# Patient Record
Sex: MALE | Race: WHITE | Employment: OTHER
[De-identification: names, ages, dates, MRNs, and addresses within clinical notes are randomized per-mention and may not be internally consistent; named-entity substitution may affect disease eponyms.]

---

## 2018-01-29 ENCOUNTER — TELEPHONE (OUTPATIENT)
Dept: CASE MANAGEMENT | Age: 68
End: 2018-01-29

## 2018-01-29 NOTE — LETTER
Medical Imaging Services                        1/29/2018           [facility]  [address] [place] [address]       Dear Arron Lopez,    An appointment has been scheduled for you for a CT Lung Screening. Appointment Details:  February 06 2018  [facility] Radiology Department  Time: 7:30 AM   (Please arrive 30 minutes early for registration)      Please fill out the enclosed questionnaire, bring it with you the day of screening and give it to the technologist who completes your scan. Please make sure to answer every question and call us at [phone] with any questions you might have. If you are unable to keep the appointment please call the scheduling department at [phone] to cancel or reschedule your appointment. Sincerely,    Fermin    P:  [phone]  F:  [phone]            Exam Date: 2/6/18  Facility:  [facility]  Male    1950 78 y.o.                           [id number]  [address] [address]      Order Verified_____    BL: ____   AN:______       Sukumar Boyle MD          Smoking Hx per anthonyan order: Former or Current.   Pkyrs______/ Quit date:  ____    Shared Decision Making Visit _____     Smoking Cessation Discussion ______    Coverage:____________________________________    Radiologist:________________       Scanner:  ___________________________    CTDiVol:_________ DLP:  ___________  Slice:  ______________    Lung Rad-______________  Inc__________________________________________      HT_______  WT________   Race_____  Ethn____      Patient notification letter  ____________  Physician Notified__________________    Req Smoking Cessation_____      Packet mailed____________  Referral _________    Date entered into NRDR____________________      Follow up notes:    ___________________________________________________________________________ ___________________________________________________________________________________________________________________________________________________________________________________________________________________________________________________________________________________________________________________________________________________________________________________________________________________________________________________________________________________________________________________________________________________________________________

## 2018-02-07 ENCOUNTER — TELEPHONE (OUTPATIENT)
Dept: CASE MANAGEMENT | Age: 68
End: 2018-02-07

## 2018-02-08 ENCOUNTER — TELEPHONE (OUTPATIENT)
Dept: CASE MANAGEMENT | Age: 68
End: 2018-02-08

## 2018-02-08 NOTE — TELEPHONE ENCOUNTER
Patient requested information on smoking cessation. Smoking Cessation packet that contains \"Time to [address] [facility] brochure and  Santos Naznicole Quit Now? \"- [facility],  both mailed to patients home. Hazel Shultz was  encouraged to call and talk to one of our counselors or our Lung Screening patient navigator for more information/support.      JHONY Chavarria., R.T.(R)(T),   [facility]  Lung Nodule Navigator

## 2018-10-24 ENCOUNTER — HOSPITAL ENCOUNTER (OUTPATIENT)
Age: 68
Discharge: HOME | End: 2018-10-24
Payer: MEDICARE

## 2018-10-24 LAB — PROSTATE SPECIFIC ANTIGEN: 0.28 NG/ML (ref 0–4)

## 2018-10-24 PROCEDURE — G0103 PSA SCREENING: HCPCS

## 2018-10-24 PROCEDURE — 36415 COLL VENOUS BLD VENIPUNCTURE: CPT

## 2019-01-15 ENCOUNTER — TELEPHONE (OUTPATIENT)
Dept: CASE MANAGEMENT | Age: 69
End: 2019-01-15

## 2019-02-15 ENCOUNTER — TELEPHONE (OUTPATIENT)
Dept: CASE MANAGEMENT | Age: 69
End: 2019-02-15

## 2021-02-10 ENCOUNTER — HOSPITAL ENCOUNTER (OUTPATIENT)
Age: 71
Discharge: HOME | End: 2021-02-10
Payer: MEDICARE

## 2021-02-10 LAB
ALBUMIN SERPL-MCNC: 4.2 G/DL (ref 3.5–5.2)
ALP BLD-CCNC: 110 U/L (ref 40–129)
ALT SERPL-CCNC: 8 U/L (ref 0–40)
ANION GAP SERPL CALCULATED.3IONS-SCNC: 10 MMOL/L (ref 7–16)
AST SERPL-CCNC: 20 U/L (ref 0–39)
BASOPHILS ABSOLUTE: 0.05 E9/L (ref 0–0.2)
BASOPHILS RELATIVE PERCENT: 1 % (ref 0–2)
BILIRUB SERPL-MCNC: 0.3 MG/DL (ref 0–1.2)
BUN BLDV-MCNC: 11 MG/DL (ref 8–23)
CALCIUM SERPL-MCNC: 9.1 MG/DL (ref 8.6–10.2)
CHLORIDE BLD-SCNC: 99 MMOL/L (ref 98–107)
CHOLESTEROL, TOTAL: 198 MG/DL (ref 0–199)
CO2: 29 MMOL/L (ref 22–29)
CREAT SERPL-MCNC: 0.8 MG/DL (ref 0.7–1.2)
EOSINOPHILS ABSOLUTE: 0.14 E9/L (ref 0.05–0.5)
EOSINOPHILS RELATIVE PERCENT: 2.7 % (ref 0–6)
GFR AFRICAN AMERICAN: >60
GFR NON-AFRICAN AMERICAN: >60 ML/MIN/1.73
GLUCOSE BLD-MCNC: 90 MG/DL (ref 74–99)
HCT VFR BLD CALC: 45 % (ref 37–54)
HDLC SERPL-MCNC: 73 MG/DL
HEMOGLOBIN: 15 G/DL (ref 12.5–16.5)
IMMATURE GRANULOCYTES #: 0.02 E9/L
IMMATURE GRANULOCYTES %: 0.4 % (ref 0–5)
LDL CHOLESTEROL CALCULATED: 115 MG/DL (ref 0–99)
LYMPHOCYTES ABSOLUTE: 1.47 E9/L (ref 1.5–4)
LYMPHOCYTES RELATIVE PERCENT: 28.1 % (ref 20–42)
MCH RBC QN AUTO: 31.3 PG (ref 26–35)
MCHC RBC AUTO-ENTMCNC: 33.3 % (ref 32–34.5)
MCV RBC AUTO: 93.8 FL (ref 80–99.9)
MONOCYTES ABSOLUTE: 0.41 E9/L (ref 0.1–0.95)
MONOCYTES RELATIVE PERCENT: 7.8 % (ref 2–12)
NEUTROPHILS ABSOLUTE: 3.14 E9/L (ref 1.8–7.3)
NEUTROPHILS RELATIVE PERCENT: 60 % (ref 43–80)
PDW BLD-RTO: 12.8 FL (ref 11.5–15)
PLATELET # BLD: 243 E9/L (ref 130–450)
PMV BLD AUTO: 10.4 FL (ref 7–12)
POTASSIUM SERPL-SCNC: 4.7 MMOL/L (ref 3.5–5)
PROSTATE SPECIFIC ANTIGEN: 0.34 NG/ML (ref 0–4)
RBC # BLD: 4.8 E12/L (ref 3.8–5.8)
SODIUM BLD-SCNC: 138 MMOL/L (ref 132–146)
TOTAL PROTEIN: 6.9 G/DL (ref 6.4–8.3)
TRIGL SERPL-MCNC: 52 MG/DL (ref 0–149)
VLDLC SERPL CALC-MCNC: 10 MG/DL
WBC # BLD: 5.2 E9/L (ref 4.5–11.5)

## 2021-02-10 PROCEDURE — G0103 PSA SCREENING: HCPCS

## 2021-02-10 PROCEDURE — 85025 COMPLETE CBC W/AUTO DIFF WBC: CPT

## 2021-02-10 PROCEDURE — 80053 COMPREHEN METABOLIC PANEL: CPT

## 2021-02-10 PROCEDURE — 80186 ASSAY OF PHENYTOIN FREE: CPT

## 2021-02-10 PROCEDURE — 36415 COLL VENOUS BLD VENIPUNCTURE: CPT

## 2021-02-10 PROCEDURE — 80061 LIPID PANEL: CPT

## 2021-02-10 PROCEDURE — 80185 ASSAY OF PHENYTOIN TOTAL: CPT

## 2021-02-12 LAB
PHENYTOIN % FREE: 10.3 % (ref 8–14)
PHENYTOIN FREE: 1.4 UG/ML (ref 1–2.5)
PHENYTOIN LEVEL: 13.6 UG/ML (ref 10–20)

## 2022-01-06 ENCOUNTER — TELEPHONE (OUTPATIENT)
Dept: FAMILY MEDICINE CLINIC | Age: 72
End: 2022-01-06

## 2022-01-06 RX ORDER — BENZONATATE 100 MG/1
100 CAPSULE ORAL 3 TIMES DAILY PRN
Qty: 21 CAPSULE | Refills: 1 | Status: SHIPPED | OUTPATIENT
Start: 2022-01-06 | End: 2022-01-13

## 2022-01-06 RX ORDER — DOXYCYCLINE HYCLATE 100 MG
100 TABLET ORAL 2 TIMES DAILY
Qty: 14 TABLET | Refills: 1 | Status: SHIPPED | OUTPATIENT
Start: 2022-01-06 | End: 2022-01-13

## 2022-01-06 RX ORDER — BROMPHENIRAMINE MALEATE, PSEUDOEPHEDRINE HYDROCHLORIDE, AND DEXTROMETHORPHAN HYDROBROMIDE 2; 30; 10 MG/5ML; MG/5ML; MG/5ML
5 SYRUP ORAL 4 TIMES DAILY PRN
Qty: 120 ML | Refills: 1 | Status: SHIPPED | OUTPATIENT
Start: 2022-01-06

## 2022-01-06 RX ORDER — DEXAMETHASONE 6 MG/1
6 TABLET ORAL
Qty: 7 TABLET | Refills: 0 | Status: SHIPPED | OUTPATIENT
Start: 2022-01-06 | End: 2022-01-13

## 2022-07-11 ENCOUNTER — HOSPITAL ENCOUNTER (OUTPATIENT)
Age: 72
Discharge: HOME | End: 2022-07-11
Payer: MEDICARE

## 2022-07-11 LAB
ALBUMIN SERPL-MCNC: 4.2 G/DL (ref 3.5–5.2)
ALP BLD-CCNC: 105 U/L (ref 40–129)
ALT SERPL-CCNC: 9 U/L (ref 0–40)
ANION GAP SERPL CALCULATED.3IONS-SCNC: 10 MMOL/L (ref 7–16)
AST SERPL-CCNC: 23 U/L (ref 0–39)
BASOPHILS ABSOLUTE: 0.05 E9/L (ref 0–0.2)
BASOPHILS RELATIVE PERCENT: 1 % (ref 0–2)
BILIRUB SERPL-MCNC: 0.3 MG/DL (ref 0–1.2)
BUN BLDV-MCNC: 12 MG/DL (ref 6–23)
CALCIUM SERPL-MCNC: 9.2 MG/DL (ref 8.6–10.2)
CHLORIDE BLD-SCNC: 98 MMOL/L (ref 98–107)
CHOLESTEROL, TOTAL: 218 MG/DL (ref 0–199)
CO2: 26 MMOL/L (ref 22–29)
CREAT SERPL-MCNC: 0.8 MG/DL (ref 0.7–1.2)
EOSINOPHILS ABSOLUTE: 0.07 E9/L (ref 0.05–0.5)
EOSINOPHILS RELATIVE PERCENT: 1.4 % (ref 0–6)
GFR AFRICAN AMERICAN: >60
GFR NON-AFRICAN AMERICAN: >60 ML/MIN/1.73
GLUCOSE BLD-MCNC: 86 MG/DL (ref 74–99)
HCT VFR BLD CALC: 42.2 % (ref 37–54)
HDLC SERPL-MCNC: 77 MG/DL
HEMOGLOBIN: 14.5 G/DL (ref 12.5–16.5)
IMMATURE GRANULOCYTES #: 0.02 E9/L
IMMATURE GRANULOCYTES %: 0.4 % (ref 0–5)
LDL CHOLESTEROL CALCULATED: 130 MG/DL (ref 0–99)
LYMPHOCYTES ABSOLUTE: 1.36 E9/L (ref 1.5–4)
LYMPHOCYTES RELATIVE PERCENT: 27.4 % (ref 20–42)
MCH RBC QN AUTO: 30.9 PG (ref 26–35)
MCHC RBC AUTO-ENTMCNC: 34.4 % (ref 32–34.5)
MCV RBC AUTO: 90 FL (ref 80–99.9)
MONOCYTES ABSOLUTE: 0.35 E9/L (ref 0.1–0.95)
MONOCYTES RELATIVE PERCENT: 7.1 % (ref 2–12)
NEUTROPHILS ABSOLUTE: 3.11 E9/L (ref 1.8–7.3)
NEUTROPHILS RELATIVE PERCENT: 62.7 % (ref 43–80)
PDW BLD-RTO: 12.9 FL (ref 11.5–15)
PLATELET # BLD: 242 E9/L (ref 130–450)
PMV BLD AUTO: 10 FL (ref 7–12)
POTASSIUM SERPL-SCNC: 4.6 MMOL/L (ref 3.5–5)
PROSTATE SPECIFIC ANTIGEN: 0.28 NG/ML (ref 0–4)
RBC # BLD: 4.69 E12/L (ref 3.8–5.8)
SODIUM BLD-SCNC: 134 MMOL/L (ref 132–146)
TOTAL PROTEIN: 6.6 G/DL (ref 6.4–8.3)
TRIGL SERPL-MCNC: 56 MG/DL (ref 0–149)
VLDLC SERPL CALC-MCNC: 11 MG/DL
WBC # BLD: 5 E9/L (ref 4.5–11.5)

## 2022-07-11 PROCEDURE — 36415 COLL VENOUS BLD VENIPUNCTURE: CPT

## 2022-07-11 PROCEDURE — 80053 COMPREHEN METABOLIC PANEL: CPT

## 2022-07-11 PROCEDURE — 80185 ASSAY OF PHENYTOIN TOTAL: CPT

## 2022-07-11 PROCEDURE — G0103 PSA SCREENING: HCPCS

## 2022-07-11 PROCEDURE — 85025 COMPLETE CBC W/AUTO DIFF WBC: CPT

## 2022-07-11 PROCEDURE — 80061 LIPID PANEL: CPT

## 2022-07-11 PROCEDURE — 80186 ASSAY OF PHENYTOIN FREE: CPT

## 2022-07-15 LAB
PHENYTOIN % FREE: 7.9 % (ref 8–14)
PHENYTOIN FREE: 1.2 UG/ML (ref 1–2.5)
PHENYTOIN LEVEL: 15.2 UG/ML (ref 10–20)

## 2023-07-17 ENCOUNTER — HOSPITAL ENCOUNTER (OUTPATIENT)
Age: 73
Discharge: HOME | End: 2023-07-17
Payer: MEDICARE

## 2023-07-17 PROCEDURE — 84153 ASSAY OF PSA TOTAL: CPT

## 2023-07-17 PROCEDURE — 36415 COLL VENOUS BLD VENIPUNCTURE: CPT

## 2023-07-18 LAB — PSA SERPL-MCNC: 0.27 NG/ML (ref 0–4)

## 2024-09-10 ENCOUNTER — HOSPITAL ENCOUNTER (OUTPATIENT)
Age: 74
Discharge: HOME | End: 2024-09-10
Payer: MEDICARE

## 2024-09-10 LAB — PSA SERPL-MCNC: 0.28 NG/ML (ref 0–4)

## 2024-09-10 PROCEDURE — G0103 PSA SCREENING: HCPCS

## 2024-09-10 PROCEDURE — 36415 COLL VENOUS BLD VENIPUNCTURE: CPT

## 2025-04-09 ENCOUNTER — APPOINTMENT (OUTPATIENT)
Dept: ORTHOPEDIC SURGERY | Facility: CLINIC | Age: 75
End: 2025-04-09
Payer: MEDICARE

## 2025-04-09 DIAGNOSIS — M54.16 LUMBAR RADICULOPATHY: ICD-10-CM

## 2025-04-09 PROCEDURE — 99204 OFFICE O/P NEW MOD 45 MIN: CPT | Performed by: ORTHOPAEDIC SURGERY

## 2025-04-09 PROCEDURE — 1125F AMNT PAIN NOTED PAIN PRSNT: CPT | Performed by: ORTHOPAEDIC SURGERY

## 2025-04-09 PROCEDURE — 1159F MED LIST DOCD IN RCRD: CPT | Performed by: ORTHOPAEDIC SURGERY

## 2025-04-09 ASSESSMENT — PAIN SCALES - GENERAL: PAINLEVEL_OUTOF10: 4

## 2025-04-09 ASSESSMENT — PAIN - FUNCTIONAL ASSESSMENT: PAIN_FUNCTIONAL_ASSESSMENT: 0-10

## 2025-04-09 NOTE — PROGRESS NOTES
Ashu is a very pleasant 74-year-old retired man who is referred by his brother, Rasta, who is a patient of mine.    He has had longstanding lower extremity pain and numbness.  Prolonged standing and walking is very difficult.  Stopping and sitting relieves his symptoms to some degree.    He has been involved in the pain program at the [place] [facility].    He uses gabapentin.    He has had a successful total hip replacement    He helps his wife significantly who has significant medical issues.    Family, social, and medical histories are obtained and reviewed.    30-point, patient-recorded Review of Systems is personally obtained and reviewed. Inclusive is no history of weight loss, change in appetite, recent change in activity level, change in bowel or bladder habits, fevers, chills, malaise, or night pain.    On exam pleasant older man no acute distress.  Slow deliberate gait.  Kyphotic posture.  He can come up to neutral.    Painless motion both hips.    His strength is intact in the lower extremities with mild weakness in ankle dorsiflexion bilateral, 4/5.  No hyperreflexia.    Plain films of the lumbar spine show preserved lordosis.  Short pedicles consistent with congenital stenosis.  Advanced degenerative disc disease throughout the lumbar spine.    An MRI from 2021 shows severe multilevel stenosis L2-L5, due to degenerative changes in the setting of severe congenital stenosis.    Impression: He has disabling lumbar radiculopathy and neurogenic claudication.  The degree of stenosis that he has certainly would warrant consideration of surgery.    Likely, surgery would be a posterior decompression.  He would not need a spine fusion.  The goal of surgery would be to decrease his lower extremity symptoms.    The risks and benefits and expectations of surgery have been discussed.    He is going to consider things.  I have suggested that we update his lumbar MRI to get a better sense of the magnitude of this  issue and then we will further discuss treatment options.    ** Dictated with voice recognition software and not immediately reviewed for errors in grammar and/or spelling **

## 2025-05-12 ENCOUNTER — ANCILLARY PROCEDURE (OUTPATIENT)
Facility: HOSPITAL | Age: 75
End: 2025-05-12
Payer: MEDICARE

## 2025-05-12 DIAGNOSIS — M54.16 LUMBAR RADICULOPATHY: ICD-10-CM

## 2025-05-12 PROCEDURE — 72148 MRI LUMBAR SPINE W/O DYE: CPT

## 2025-05-12 PROCEDURE — 72148 MRI LUMBAR SPINE W/O DYE: CPT | Performed by: RADIOLOGY

## 2025-05-15 ENCOUNTER — DOCUMENTATION (OUTPATIENT)
Dept: ORTHOPEDICS | Facility: HOSPITAL | Age: 75
End: 2025-05-15
Payer: MEDICARE

## 2025-05-15 NOTE — PROGRESS NOTES
I spoke with Axel.  His MRI, as expected, shows quite severe lumbar stenosis.  He is quite symptomatic with neurogenic claudication and radiculopathy.    We discussed the treatment options ranging from further conservative measures of exercise and conditioning to surgery in the form of a lumbar decompression.  He would be a good candidate for surgery in the form of an L2-5 decompression.  He would not need a spine fusion.    He is going to consider things.    ** Dictated with voice recognition software and not immediately reviewed for errors in grammar and/or spelling **

## 2025-05-28 PROBLEM — M48.062 SPINAL STENOSIS, LUMBAR REGION WITH NEUROGENIC CLAUDICATION: Status: ACTIVE | Noted: 2025-05-28

## 2025-06-26 ENCOUNTER — APPOINTMENT (OUTPATIENT)
Dept: PREADMISSION TESTING | Facility: HOSPITAL | Age: 75
End: 2025-06-26
Payer: MEDICARE

## 2025-06-26 ENCOUNTER — TELEPHONE (OUTPATIENT)
Dept: INPATIENT UNIT | Facility: HOSPITAL | Age: 75
End: 2025-06-26
Payer: MEDICARE

## 2025-06-26 RX ORDER — NAPROXEN 250 MG/1
250 TABLET ORAL
COMMUNITY
End: 2025-06-26 | Stop reason: SDUPTHER

## 2025-06-26 RX ORDER — GABAPENTIN 600 MG/1
600 TABLET ORAL 3 TIMES DAILY
COMMUNITY

## 2025-06-26 RX ORDER — ASPIRIN 81 MG/1
81 TABLET ORAL DAILY
COMMUNITY

## 2025-06-26 RX ORDER — VIT C/E/ZN/COPPR/LUTEIN/ZEAXAN 250MG-90MG
1 CAPSULE ORAL DAILY
COMMUNITY
End: 2025-07-04 | Stop reason: HOSPADM

## 2025-06-26 RX ORDER — NAPROXEN SODIUM 220 MG
220 TABLET ORAL
COMMUNITY
End: 2025-07-04 | Stop reason: HOSPADM

## 2025-06-26 RX ORDER — METOPROLOL SUCCINATE 25 MG/1
25 TABLET, EXTENDED RELEASE ORAL
COMMUNITY
Start: 2025-05-01

## 2025-06-26 RX ORDER — VIT C/E/ZN/COPPR/LUTEIN/ZEAXAN 250MG-90MG
600 CAPSULE ORAL 2 TIMES DAILY
COMMUNITY
End: 2025-07-04 | Stop reason: HOSPADM

## 2025-06-26 RX ORDER — PHENYTOIN SODIUM 100 MG/1
1 CAPSULE, EXTENDED RELEASE ORAL 3 TIMES DAILY
COMMUNITY

## 2025-06-26 RX ORDER — BISMUTH SUBSALICYLATE 262 MG
1 TABLET,CHEWABLE ORAL DAILY
COMMUNITY
End: 2025-07-04 | Stop reason: HOSPADM

## 2025-06-26 RX ORDER — GLUCOSAMINE HCL 500 MG
100 TABLET ORAL DAILY
COMMUNITY
End: 2025-07-04 | Stop reason: HOSPADM

## 2025-06-26 NOTE — CPM/PAT NURSE NOTE
CPM/PAT Nurse Note      Name: Ashu Gomez (Raymond Housel)  /Age: 1950/75 y.o.       Medical History[1]    Surgical History[2]    Patient  reports that he is not currently sexually active and has had partner(s) who are female.    Family History[3]    Allergies[4]    Prior to Admission medications    Medication Sig Start Date End Date Taking? Authorizing Provider   aspirin 81 mg EC tablet Take 1 tablet (81 mg) by mouth once daily.    Historical Provider, MD   co-enzyme Q-10 30 mg capsule Take 100 mg by mouth once daily.    Historical Provider, MD   gabapentin (Neurontin) 300 mg capsule Take 2 capsules (600 mg) by mouth 3 times a day.    Historical Provider, MD   metoprolol succinate XL (Toprol-XL) 25 mg 24 hr tablet Take 1 tablet (25 mg) by mouth once daily. 25   Historical Provider, MD   multivitamin tablet Take 1 tablet by mouth once daily.    Historical Provider, MD   naproxen sodium (Aleve) 220 mg tablet Take 1 tablet (220 mg) by mouth 2 times daily (morning and late afternoon).    Historical Provider, MD   omega-3 fatty acids-fish oil (Fish OiL) 360-1,200 mg capsule Take 1 capsule (1,200 mg) by mouth once daily.    Historical Provider, MD   phenytoin ER (Dilantin) 100 mg capsule Take 1 capsule (100 mg) by mouth 3 times a day.    Historical Provider, MD   red yeast rice 600 mg capsule Take 600 mg by mouth 2 times a day.    Historical Provider, MD   naproxen (Naprosyn) 250 mg tablet Take 1 tablet (250 mg) by mouth 2 times daily (morning and late afternoon).  25  Historical Provider, MD VILLANUEVA ROS     DASI Risk Score    No data to display       Caprini DVT Assessment    No data to display       Modified Frailty Index    No data to display       [id number] Stroke Risk Points  Current as of just now        N/A 0 to 9 Points:      Last Change: N/A          The [id number]-BOXe risk score (Lip STEFANI, et al. 2009. © 2010 American College [employer]) quantifies the risk of stroke for a patient  with atrial fibrillation. For patients without atrial fibrillation or under the age of 18 this score appears as N/A. Higher score values generally indicate higher risk of stroke.        This score is not applicable to this patient. Components are not calculated.          Revised Cardiac Risk Index    No data to display       Apfel Simplified Score    No data to display       Risk Analysis Index Results This Encounter    No data found in the last 10 encounters.       Prodigy: High Risk  Total Score: 20              Prodigy Age Score      Prodigy Gender Score          ARISCAT Score for Postoperative Pulmonary Complications    No data to display       Ruiz Perioperative Risk for Myocardial Infarction or Cardiac Arrest (EARNESTINE)    No data to display         Nurse Plan of Action:     Med only RN screening call complete.  Reviewed allergies, medications and pharmacy.  Instructed patient to stop Aleve, vitamins and supplements now prior to surgery.             [1]   Past Medical History:  Diagnosis Date    Arthritis     Chronic pain disorder     Back    Colon polyp 25 yrs ago, get colonoscopies every 3 yrs    COVID-19 Jan. 2021    Twice even with current vaccines    Easy bruising ?    Fractures 40 yrs ago    Ankle    Hypertension 5 yrs ago    Irritable bowel syndrome Feb 2024    Joint pain     Back    Peripheral neuropathy     Ft and legs, 9 yrs ago    Seizure disorder (Multi) 25 yrs. Diagnosed epilepsy    Spinal stenosis    [2]   Past Surgical History:  Procedure Laterality Date    ANKLE FRACTURE SURGERY  1985?    Healed on its own    COLONOSCOPY  Apr 2024    Every 3 yrs    JOINT REPLACEMENT  May 2011    Left    OTHER SURGICAL HISTORY      Left total hip replacement  2011 and asteroid [id number]    TONSILLECTOMY  63 + yrs ago    UPPER GASTROINTESTINAL ENDOSCOPY  Apr. 2024   [3]   Family History  Problem Relation Name Age of Onset    No Known Problems Mother      Kidney disease Father      Heart disease Father      No  Known Problems Sister      Heart attack Brother     [4] No Known Allergies

## 2025-06-27 ENCOUNTER — PRE-ADMISSION TESTING (OUTPATIENT)
Dept: PREADMISSION TESTING | Facility: HOSPITAL | Age: 75
End: 2025-06-27
Payer: MEDICARE

## 2025-06-27 ENCOUNTER — LAB (OUTPATIENT)
Dept: LAB | Facility: HOSPITAL | Age: 75
End: 2025-06-27
Payer: MEDICARE

## 2025-06-27 VITALS
HEART RATE: 62 BPM | TEMPERATURE: 97.6 F | HEIGHT: 68 IN | OXYGEN SATURATION: 98 % | SYSTOLIC BLOOD PRESSURE: 157 MMHG | WEIGHT: 123.46 LBS | BODY MASS INDEX: 18.71 KG/M2 | RESPIRATION RATE: 16 BRPM | DIASTOLIC BLOOD PRESSURE: 88 MMHG

## 2025-06-27 DIAGNOSIS — R73.9 ELEVATED BLOOD SUGAR: ICD-10-CM

## 2025-06-27 DIAGNOSIS — R06.02 SHORTNESS OF BREATH: ICD-10-CM

## 2025-06-27 DIAGNOSIS — Z01.818 ENCOUNTER FOR OTHER PREPROCEDURAL EXAMINATION: Primary | ICD-10-CM

## 2025-06-27 DIAGNOSIS — R73.9 HYPERGLYCEMIA, UNSPECIFIED: ICD-10-CM

## 2025-06-27 DIAGNOSIS — Z01.818 PREOP TESTING: Primary | ICD-10-CM

## 2025-06-27 DIAGNOSIS — R06.02 SOB (SHORTNESS OF BREATH) ON EXERTION: ICD-10-CM

## 2025-06-27 DIAGNOSIS — R39.9 LOWER URINARY TRACT SYMPTOMS (LUTS): ICD-10-CM

## 2025-06-27 LAB
ABO GROUP (TYPE) IN BLOOD: NORMAL
ANION GAP SERPL CALC-SCNC: 10 MMOL/L (ref 10–20)
ANTIBODY SCREEN: NORMAL
APPEARANCE UR: CLEAR
APTT PPP: 30 SECONDS (ref 26–36)
BASOPHILS # BLD AUTO: 0.04 X10*3/UL (ref 0–0.1)
BASOPHILS NFR BLD AUTO: 0.7 %
BILIRUB UR STRIP.AUTO-MCNC: NEGATIVE MG/DL
BUN SERPL-MCNC: 13 MG/DL (ref 6–23)
CALCIUM SERPL-MCNC: 8.9 MG/DL (ref 8.6–10.3)
CHLORIDE SERPL-SCNC: 102 MMOL/L (ref 98–107)
CO2 SERPL-SCNC: 31 MMOL/L (ref 21–32)
COLOR UR: NORMAL
CREAT SERPL-MCNC: 0.77 MG/DL (ref 0.5–1.3)
EGFRCR SERPLBLD CKD-EPI 2021: >90 ML/MIN/1.73M*2
EOSINOPHIL # BLD AUTO: 0.05 X10*3/UL (ref 0–0.4)
EOSINOPHIL NFR BLD AUTO: 0.9 %
ERYTHROCYTE [DISTWIDTH] IN BLOOD BY AUTOMATED COUNT: 12.7 % (ref 11.5–14.5)
EST. AVERAGE GLUCOSE BLD GHB EST-MCNC: 97 MG/DL
GLUCOSE SERPL-MCNC: 70 MG/DL (ref 74–99)
GLUCOSE UR STRIP.AUTO-MCNC: NORMAL MG/DL
HBA1C MFR BLD: 5 % (ref ?–5.7)
HCT VFR BLD AUTO: 43 % (ref 41–52)
HGB BLD-MCNC: 14.5 G/DL (ref 13.5–17.5)
IMM GRANULOCYTES # BLD AUTO: 0.01 X10*3/UL (ref 0–0.5)
IMM GRANULOCYTES NFR BLD AUTO: 0.2 % (ref 0–0.9)
INR PPP: 1 (ref 0.9–1.1)
KETONES UR STRIP.AUTO-MCNC: NEGATIVE MG/DL
LEUKOCYTE ESTERASE UR QL STRIP.AUTO: NEGATIVE
LYMPHOCYTES # BLD AUTO: 1.25 X10*3/UL (ref 0.8–3)
LYMPHOCYTES NFR BLD AUTO: 22.1 %
MCH RBC QN AUTO: 31 PG (ref 26–34)
MCHC RBC AUTO-ENTMCNC: 33.7 G/DL (ref 32–36)
MCV RBC AUTO: 92 FL (ref 80–100)
MONOCYTES # BLD AUTO: 0.43 X10*3/UL (ref 0.05–0.8)
MONOCYTES NFR BLD AUTO: 7.6 %
NEUTROPHILS # BLD AUTO: 3.87 X10*3/UL (ref 1.6–5.5)
NEUTROPHILS NFR BLD AUTO: 68.5 %
NITRITE UR QL STRIP.AUTO: NEGATIVE
NRBC BLD-RTO: 0 /100 WBCS (ref 0–0)
PH UR STRIP.AUTO: 5 [PH]
PLATELET # BLD AUTO: 264 X10*3/UL (ref 150–450)
POTASSIUM SERPL-SCNC: 4.7 MMOL/L (ref 3.5–5.3)
PROT UR STRIP.AUTO-MCNC: NEGATIVE MG/DL
PROTHROMBIN TIME: 11.5 SECONDS (ref 9.8–12.4)
RBC # BLD AUTO: 4.67 X10*6/UL (ref 4.5–5.9)
RBC # UR STRIP.AUTO: NEGATIVE MG/DL
RH FACTOR (ANTIGEN D): NORMAL
SODIUM SERPL-SCNC: 138 MMOL/L (ref 136–145)
SP GR UR STRIP.AUTO: 1.01
UROBILINOGEN UR STRIP.AUTO-MCNC: NORMAL MG/DL
WBC # BLD AUTO: 5.7 X10*3/UL (ref 4.4–11.3)

## 2025-06-27 PROCEDURE — 86901 BLOOD TYPING SEROLOGIC RH(D): CPT

## 2025-06-27 PROCEDURE — 83036 HEMOGLOBIN GLYCOSYLATED A1C: CPT

## 2025-06-27 PROCEDURE — 85730 THROMBOPLASTIN TIME PARTIAL: CPT

## 2025-06-27 PROCEDURE — 93010 ELECTROCARDIOGRAM REPORT: CPT | Performed by: INTERNAL MEDICINE

## 2025-06-27 PROCEDURE — 99204 OFFICE O/P NEW MOD 45 MIN: CPT | Performed by: PHYSICIAN ASSISTANT

## 2025-06-27 PROCEDURE — 86850 RBC ANTIBODY SCREEN: CPT

## 2025-06-27 PROCEDURE — 85025 COMPLETE CBC W/AUTO DIFF WBC: CPT

## 2025-06-27 PROCEDURE — 81003 URINALYSIS AUTO W/O SCOPE: CPT | Performed by: PHYSICIAN ASSISTANT

## 2025-06-27 PROCEDURE — 86900 BLOOD TYPING SEROLOGIC ABO: CPT

## 2025-06-27 PROCEDURE — 85610 PROTHROMBIN TIME: CPT

## 2025-06-27 PROCEDURE — 87081 CULTURE SCREEN ONLY: CPT | Mod: AHULAB | Performed by: PHYSICIAN ASSISTANT

## 2025-06-27 PROCEDURE — 80048 BASIC METABOLIC PNL TOTAL CA: CPT

## 2025-06-27 PROCEDURE — 36415 COLL VENOUS BLD VENIPUNCTURE: CPT

## 2025-06-27 RX ORDER — CHLORHEXIDINE GLUCONATE ORAL RINSE 1.2 MG/ML
SOLUTION DENTAL
Qty: 475 ML | Refills: 0 | Status: SHIPPED | OUTPATIENT
Start: 2025-06-27 | End: 2025-07-04 | Stop reason: HOSPADM

## 2025-06-27 ASSESSMENT — ENCOUNTER SYMPTOMS
BLOOD IN STOOL: 0
RHINORRHEA: 0
SINUS CONGESTION: 0
DIARRHEA: 1
SHORTNESS OF BREATH: 0
NUMBNESS: 0
FEVER: 0
EYE PAIN: 0
UNEXPECTED WEIGHT CHANGE: 0
CONFUSION: 0
WOUND: 0
NAUSEA: 0
MYALGIAS: 0
ABDOMINAL PAIN: 0
NECK PAIN: 0
DYSPNEA AT REST: 0
SKIN CHANGES: 0
ABDOMINAL DISTENTION: 0
WHEEZING: 0
DYSURIA: 0
CHILLS: 0
COUGH: 0
DIFFICULTY URINATING: 0
DYSPNEA WITH EXERTION: 0
VOMITING: 0
VISUAL CHANGE: 0
LIGHT-HEADEDNESS: 0
BRUISES/BLEEDS EASILY: 1
EXCESSIVE BLEEDING: 0
HEMOPTYSIS: 0
NECK STIFFNESS: 0
DOUBLE VISION: 0
WEAKNESS: 0
ARTHRALGIAS: 1
PALPITATIONS: 0
CONSTIPATION: 0
TROUBLE SWALLOWING: 0
EYE DISCHARGE: 0
LIMITED RANGE OF MOTION: 0

## 2025-06-27 NOTE — CPM/PAT H&P
[facility]/PAT Evaluation       Name: Ashu Gomez (Ashu Gomez)  /Age: 1950/75 y.o.       Date of Consult: 25    Referring Provider: Dr. Moody    Surgery, Date, and Length:     L2-L5 Lumbar Spine Decompression   25, 135MIN    Ashu Gomez is a 75 y.o. year-old male who presents to the [facility] for perioperative risk assessment prior to surgery.    Patient presents with a primary diagnosis of lumbar spinal stenosis.     He has had longstanding lower extremity pain and numbness, since 2016.  Prolonged standing and walking is very difficult.  Stopping and sitting relieves his symptoms to some degree.      He has been involved in the pain program at the [place] [facility].  He uses gabapentin and naproxen, which provide some pain relief.  He has attempted LESI and RFA without much relief.      This note was created in part upon personal review of patient's medical records.      Patient is scheduled to have L2-L5 Lumbar Spine Decompression    Pt does admit to h/o alcohol abuse and tendencies of abuse. His family is concerned about the use of narcotics for his pain control. I assured pt and family that narcotics will likely only be utilized for the acute post operative pain and will be weaned down / off as soon as possible.     Pt denies any past history of anesthetic complications such as PONV, awareness, prolonged sedation, dental damage, aspiration, cardiac arrest, difficult intubation, difficult I.V. access or unexpected hospital admissions.  NO malignant hyperthermia and or pseudocholinesterase deficiency.  No history of blood transfusions     The patient is not a Scientology and will accept blood and blood products if medically indicated.   Type and screen sent.     Past Medical History:   Diagnosis Date    Arthritis     Chronic pain disorder     Back    Colon polyp 25 yrs ago, get colonoscopies every 3 yrs    COVID-.     Twice even with current vaccines    Easy bruising ?     Fractures 40 yrs ago    Ankle    Hypertension 5 yrs ago    Irritable bowel syndrome 2024    Joint pain     Back    Peripheral neuropathy     Ft and legs, 9 yrs ago    Seizure disorder (Multi) 25 yrs. Diagnosed epilepsy    Spinal stenosis        Past Surgical History:   Procedure Laterality Date    ANKLE FRACTURE SURGERY  ?    Healed on its own    COLONOSCOPY  2024    Every 3 yrs    JOINT REPLACEMENT  May 2011    Left    OTHER SURGICAL HISTORY      Left total hip replacement   and asteroid [id number]    TONSILLECTOMY  63 + yrs ago    UPPER GASTROINTESTINAL ENDOSCOPY  2024       Patient  reports that he is not currently sexually active and has had partner(s) who are female.    Family History   Problem Relation Name Age of Onset    No Known Problems Mother      Kidney disease Father      Heart disease Father      No Known Problems Sister      Heart attack Brother       Social History     Socioeconomic History    Marital status:      Spouse name: Not on file    Number of children: Not on file    Years of education: Not on file    Highest education level: Not on file   Occupational History    Not on file   Tobacco Use    Smoking status: Former     Current packs/day: 0.00     Average packs/day: 1 pack/day for 30.0 years (30.0 ttl pk-yrs)     Types: Cigarettes     Start date:      Quit date:      Years since quittin.4    Smokeless tobacco: Current     Types: Snuff    Tobacco comments:     10 oral pouches of snuff per day   Vaping Use    Vaping status: Never Used   Substance and Sexual Activity    Alcohol use: Not Currently     Comment: H/O alcoholism - last drank ; withdrawal at time caused seizures    Drug use: Never    Sexual activity: Not Currently     Partners: Female   Other Topics Concern    Not on file   Social History Narrative    Not on file     Social Drivers of Health     Financial Resource Strain: Not on file   Food Insecurity: Not on file   Transportation Needs: Not  on file   Physical Activity: Not on file   Stress: Not on file   Social Connections: Not on file   Intimate Partner Violence: Not on file   Housing Stability: Not on file        No Known Allergies    Current Outpatient Medications   Medication Instructions    aspirin 81 mg, Daily    chlorhexidine (Peridex) 0.12 % solution Swish for 30 seconds and spit 15mL of solution the night before and morning of surgery    coenzyme Q10 100 mg, Daily    gabapentin (Neurontin) 300 mg capsule 2 capsules, 3 times daily    metoprolol succinate XL (TOPROL-XL) 25 mg, Daily RT    multivitamin tablet 1 tablet, Daily    naproxen sodium (ALEVE) 220 mg, 2 times daily (morning and late afternoon)    omega-3 fatty acids-fish oil (Fish OiL) 360-1,200 mg capsule 1 capsule, Daily    phenytoin ER (Dilantin) 100 mg capsule 1 capsule, 3 times daily    red yeast rice 600 mg, 2 times daily           PAT ROS:   Constitutional:    no fever   no chills   no unexpected weight change  Neuro/Psych:    no numbness   no weakness   no light-headedness   no confusion  Eyes:    no discharge   no pain   no vision loss   no diplopia   no visual disturbance  Ears:    no ear pain   no hearing loss   no tinnitus  Nose:    no nasal discharge   no sinus congestion   no epistaxis  Mouth:    no dental issues   no mouth pain   no oral bleeding   no mouth lesions  Throat:    no throat pain   no dysphagia  Neck:    no neck pain   no neck stiffness  Cardio:    Functional 4 Mets. Patient denies SOB walking up 2 flights of stairs   Non-stop throughout the day; yard work - mowing / weed whacking / mulching  Cooking, cleaning and grocery shopping ; cares for ill wife   no chest pain   no palpitations   no peripheral edema   no dyspnea   no XIAO  Respiratory:    no cough   no wheezing   no hemoptysis   no shortness of breath  Endocrine:    no cold intolerance   no heat intolerance  GI:    no abdominal distention   no abdominal pain   no constipation   diarrhea   no nausea   no  vomiting   no blood in stool  :    no difficulty urinating   no dysuria   no oliguria  Musculoskeletal:    arthralgias (LBP, BLE)   no myalgias   no decreased ROM  Hematologic:    bruises/bleeds easily (ASA 81mg)   no excessive bleeding   no history of blood transfusion   no blood clots  Skin:   no skin changes   no sores/wound   no rash      Physical Exam  Constitutional:       General: He is not in acute distress.     Appearance: Normal appearance. He is not ill-appearing, toxic-appearing or diaphoretic.   HENT:      Head: Normocephalic and atraumatic.      Nose: Nose normal. No congestion or rhinorrhea.      Mouth/Throat:      Mouth: Mucous membranes are moist.      Pharynx: No posterior oropharyngeal erythema.   Eyes:      Extraocular Movements: Extraocular movements intact.      Conjunctiva/sclera: Conjunctivae normal.   Cardiovascular:      Rate and Rhythm: Normal rate and regular rhythm.      Pulses: Normal pulses.      Heart sounds: Normal heart sounds. No murmur heard.     No friction rub. No gallop.   Pulmonary:      Effort: Pulmonary effort is normal. No respiratory distress.      Breath sounds: Normal breath sounds. No stridor. No wheezing, rhonchi or rales.   Abdominal:      General: Bowel sounds are normal. There is no distension.      Palpations: Abdomen is soft. There is no mass.      Tenderness: There is no abdominal tenderness. There is no guarding or rebound.      Hernia: No hernia is present.   Musculoskeletal:         General: Tenderness (lumbar spine ; pain and decreased ROM with extension of lumbar spine) present. No swelling, deformity or signs of injury. Normal range of motion.      Cervical back: Normal range of motion and neck supple. No rigidity or tenderness.   Skin:     General: Skin is warm and dry.      Coloration: Skin is not jaundiced or pale.      Findings: No bruising, erythema, lesion or rash.   Neurological:      General: No focal deficit present.      Mental Status: He is  "alert and oriented to person, place, and time.      Cranial Nerves: No cranial nerve deficit.      Sensory: No sensory deficit.      Motor: No weakness.      Coordination: Coordination normal.   Psychiatric:         Mood and Affect: Mood normal.         Behavior: Behavior normal.          PAT AIRWAY:   Airway:     Mallampati::  II    Neck ROM::  Full   Few missing teeth; no loose teeth      Visit Vitals  /88   Pulse 62   Temp 36.4 °C (97.6 °F)   Resp 16   Ht 1.72 m (5' 7.72\")   Wt 56 kg (123 lb 7.3 oz)   SpO2 98%   BMI 18.93 kg/m²   Smoking Status Former   BSA 1.64 m²         EKG 6/27/25  Sinus bradycardia   Otherwise normal EKG  Vent rate = 56 bpm      Assessment and Plan:     75 y.o.  male  scheduled for L2-L5 Lumbar Spine Decompression on 7/1/25 with Dr. Moody for  lumbar spinal stenosis.   P[facility] to [facility] for perioperative risk stratification and optimization      Cardiovascular:  Patient has no active cardiac symptoms.   Patient denies any chest pain, tightness, heaviness, pressure, radiating pain, palpitations, irregular heartbeats, lightheadedness, cough, congestion, shortness of breath, XIAO, PND, near syncope, weight loss or gain.    METS: 4+  RCRI: 0 points, 3.9%  risk for postoperative MACE     HTN - cont metoprolol on dos   Encouraged lifestyle modifications, low-sodium diet, and increase activity as tolerated.  Monitor BP and follow up with managing physician for readings sustaining >140/90.       Borderline HLD - hold red yeast rice 7 days prior to surgery    Pulmonary:  No pulmonary diagnosis, however patient is at increased risk of perioperative complications secondary to  age > 60, major surgery, duration of surgery > 2 hours  Stop Bang score is 3 placing patient at intermediate risk for LA  ARISCAT: <26 points, 1.6% risk of in-hospital postoperative pulmonary complication  PRODIGY: High risk for opioid induced respiratory depression    **Pt provided with deep breathing exercises, " incentive spirometer and instructions for use during PAT visit today**      Neuro:  No neurologic diagnosis, however, the patient is at increased risk for perioperative delirium secondary to  age, polypharmacy, type and duration of surgery, Patient instructed on and provided cognitive exercises    Seizures - cont phenytoin as prescribed; last seizure 2005 (at time of alcohol withdrawal)    Hematology:  Patient instructed to ambulate as soon as possible postoperatively to decrease thromboembolic risk.   Initiate mechanical DVT prophylaxis as soon as possible and initiate chemical prophylaxis when deemed safe from a bleeding standpoint post surgery.     LABS: CBC, BMP, Coag, T&S, MRSA, A1c, UA flex and EKG ordered    Followup: LABS, MRSA and A1c pending    Caprini: 5    Risk assessment complete.  Patient is scheduled for a intermediate surgical risk procedure.        Preoperative medication instructions were provided and reviewed with the patient.  Any additional testing or evaluation was explained to the patient.  Nothing by mouth instructions were discussed and patient's questions were answered prior to conclusion to this encounter.  Patient verbalized understanding of preoperative instructions given in preadmission testing; discharge instructions available in EMR.    This note was dictated by a speech recognition.  Minor errors may have been detected in a speech recognition.        mechanical DVT prophylaxis as soon as possible and initiate chemical prophylaxis when deemed safe from a bleeding standpoint post surgery.     LABS: CBC, BMP, Coag, T&S, MRSA, A1c, UA flex and EKG ordered    Followup: LABS, MRSA and A1c pending    Addendum 6/30/25:  Lab results reviewed and unremarkable     Caprini: 5    Risk assessment complete.  Patient is scheduled for a intermediate surgical risk procedure.        Preoperative medication instructions were provided and reviewed with the patient.  Any additional testing or evaluation was explained to the patient.  Nothing by mouth instructions were discussed and patient's questions were answered prior to conclusion to this encounter.  Patient verbalized understanding of preoperative instructions given in preadmission testing; discharge instructions available in EMR.    This note was dictated by a speech recognition.  Minor errors may have been detected in a speech recognition.

## 2025-06-27 NOTE — CPM/PAT NURSE NOTE
CPM/PAT Nurse Note      Name: Ashu Gomez)  /Age: 1950/75 y.o.       Medical History[1]    Surgical History[2]    Patient  reports that he is not currently sexually active and has had partner(s) who are female.    Family History[3]    Allergies[4]    Prior to Admission medications    Medication Sig Start Date End Date Taking? Authorizing Provider   aspirin 81 mg EC tablet Take 1 tablet (81 mg) by mouth once daily.   Yes Historical Provider, MD   chlorhexidine (Peridex) 0.12 % solution Swish for 30 seconds and spit 15mL of solution the night before and morning of surgery 25   Valentine Fowler PA-C   co-enzyme Q-10 30 mg capsule Take 100 mg by mouth once daily.   Yes Historical Provider, MD   gabapentin (Neurontin) 300 mg capsule Take 2 capsules (600 mg) by mouth 3 times a day.   Yes Historical Provider, MD   metoprolol succinate XL (Toprol-XL) 25 mg 24 hr tablet Take 1 tablet (25 mg) by mouth once daily. 25  Yes Historical Provider, MD   multivitamin tablet Take 1 tablet by mouth once daily.   Yes Historical Provider, MD   naproxen sodium (Aleve) 220 mg tablet Take 1 tablet (220 mg) by mouth 2 times daily (morning and late afternoon).   Yes Historical Provider, MD   omega-3 fatty acids-fish oil (Fish OiL) 360-1,200 mg capsule Take 1 capsule (1,200 mg) by mouth once daily.   Yes Historical Provider, MD   phenytoin ER (Dilantin) 100 mg capsule Take 1 capsule (100 mg) by mouth 3 times a day.   Yes Historical Provider, MD   red yeast rice 600 mg capsule Take 600 mg by mouth 2 times a day.   Yes Historical Provider, MD   naproxen (Naprosyn) 250 mg tablet Take 1 tablet (250 mg) by mouth 2 times daily (morning and late afternoon).  25  Historical Provider, MD VILLANUEVA ROS     DASI Risk Score    No data to display       Caprini DVT Assessment    No data to display       Modified Frailty Index    No data to display       [id number] Stroke Risk Points  Current as of just now         N/A 0 to 9 Points:      Last Change: N/A          The [id number] risk score (Lip STEFANI, et al. 2009. © 2010 [employer]) quantifies the risk of stroke for a patient with atrial fibrillation. For patients without atrial fibrillation or under the age of 18 this score appears as N/A. Higher score values generally indicate higher risk of stroke.        This score is not applicable to this patient. Components are not calculated.          Revised Cardiac Risk Index    No data to display       Apfel Simplified Score    No data to display       Risk Analysis Index Results This Encounter    No data found in the last 10 encounters.       Prodigy: High Risk  Total Score: 20              Prodigy Age Score      Prodigy Gender Score          ARISCAT Score for Postoperative Pulmonary Complications      Flowsheet Row Pre-Admission Testing from 6/27/2025 in [facility] [place] with Valentine Fowler PA-C   Age Calculated Score 3 filed at 06/27/2025 1258   Preoperative SpO2 0 filed at 06/27/2025 1258   Respiratory infection in the last month Either upper or lower (i.e., URI, bronchitis, pneumonia), with fever and antibiotic treatment 0 filed at 06/27/2025 1258   Preoperative anemia (Hgb less than 10 g/dl) 0 filed at 06/27/2025 1258   Surgical incision  0 filed at 06/27/2025 1258   Duration of surgery  16 filed at 06/27/2025 1258   Emergency Procedure  0 filed at 06/27/2025 1258   ARISCAT Total Score  19 filed at 06/27/2025 1258          Sara Perioperative Risk for Myocardial Infarction or Cardiac Arrest (EARNESTINE)    No data to display         Nurse Plan of Action:       After Visit Summary (AVS) reviewed and patient verbalized good understanding of medications and NPO instructions.  Pre-op infection prevention measures:  CHG showers and mouthwash reviewed, understanding voiced.  CHG soap given and patient verbalized need to pick CHG mouthwash at their preferred local pharmacy.            [1]   Past Medical  History:  Diagnosis Date    Arthritis     Chronic pain disorder     Back    Colon polyp 25 yrs ago, get colonoscopies every 3 yrs    COVID-19 Jan. 2021    Twice even with current vaccines    Easy bruising ?    Fractures 40 yrs ago    Ankle    Hypertension 5 yrs ago    Irritable bowel syndrome Feb 2024    Joint pain     Back    Peripheral neuropathy     Ft and legs, 9 yrs ago    Seizure disorder (Multi) 25 yrs. Diagnosed epilepsy    Spinal stenosis    [2]   Past Surgical History:  Procedure Laterality Date    ANKLE FRACTURE SURGERY  1985?    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 2024   [3]   Family History  Problem Relation Name Age of Onset    No Known Problems Mother      Kidney disease Father      Heart disease Father      No Known Problems Sister      Heart attack Brother     [4] No Known Allergies

## 2025-06-27 NOTE — PREPROCEDURE INSTRUCTIONS
Medication List            Accurate as of June 27, 2025 12:59 PM. Always use your most recent med list.                Aleve 220 mg tablet  Generic drug: naproxen sodium  Additional Medication Adjustments for Surgery: Take last dose 7 days before surgery  Notes to patient: Stop now     aspirin 81 mg EC tablet  Medication Adjustments for Surgery: Take on the morning of surgery     chlorhexidine 0.12 % solution  Commonly known as: Peridex  Swish for 30 seconds and spit 15mL of solution the night before and morning of surgery  Medication Adjustments for Surgery: Take/Use as prescribed     coenzyme Q10 100 mg tablet  Additional Medication Adjustments for Surgery: Take last dose 7 days before surgery  Notes to patient: Stop now     Fish OiL 360-1,200 mg capsule  Generic drug: omega-3 fatty acids-fish oil  Additional Medication Adjustments for Surgery: Take last dose 7 days before surgery  Notes to patient: Stop now     gabapentin 300 mg capsule  Commonly known as: Neurontin  Medication Adjustments for Surgery: Take/Use as prescribed     metoprolol succinate XL 25 mg 24 hr tablet  Commonly known as: Toprol-XL  Medication Adjustments for Surgery: Take on the morning of surgery     multivitamin tablet  Additional Medication Adjustments for Surgery: Take last dose 7 days before surgery  Notes to patient: Stop now     phenytoin  mg capsule  Commonly known as: Dilantin  Medication Adjustments for Surgery: Take on the morning of surgery     red yeast rice 600 mg capsule  Additional Medication Adjustments for Surgery: Take last dose 7 days before surgery  Notes to patient: Stop now                          **Concerning above medication instructions, if medication is normally taken at night, continue normal schedule.**  **DO NOT TAKE NIGHT PRIOR AND MORNING OF SURGERY**    CONTACT SURGEON'S OFFICE IF YOU DEVELOP:  * Fever = 100.4 F   * New respiratory symptoms (e.g. cough, shortness of breath, respiratory distress, sore  throat)  * Recent loss of taste or smell  *Flu like symptoms such as headache, fatigue or gastrointestinal symptoms  * You develop any open sores, shingles, burning or painful urination   AND/OR:  * You no longer wish to have the surgery.  * Any other personal circumstances change that may lead to the need to cancel or defer this surgery.  *You were admitted to any hospital within one week of your planned procedure.    SMOKING:  *Quitting smoking can make a huge difference to your health and recovery from surgery.    *If you need help with quitting, call 0-167-QUIT-NOW.    THE DAY OF SURGERY:  *Do not eat any food after midnight the night before surgery.   *You must drink 13.5 ounces of clear liquids (i.e. water, black coffee (no milk or cream), tea, apple juice or electrolyte drinks (gatorade)) 2 hours before your arrival time.  *You may chew gum until 2 hours before your surgery    SURGICAL TIME  *You will be contacted between 2 p.m. and 6 p.m. the business day before your surgery with your arrival time.  *If you haven't received a call by 6pm, call [phone].  *Scheduled surgery times may change and you will be notified if this occurs-check your personal voicemail for any updates.    ON THE MORNING OF SURGERY:  *Wear comfortable, loose fitting clothing.   *Do not use moisturizers, creams, lotions or perfume.  *All jewelry and valuables should be left at home.  *Prosthetic devices such as contact lenses, hearing aids, dentures, eyelash extensions, hairpins and body piercing must be removed before surgery.    BRING WITH YOU:  *Photo ID and insurance card  *Current list of medicines and allergies  *Pacemaker/Defibrillator/Heart stent cards  *CPAP machine and mask  *Slings/splints/crutches  *Copy of your complete Advanced Directive/DHPOA-if applicable  *Neurostimulator implant remote    PARKING AND ARRIVAL:  *Check in at the Main Entrance desk and let them know you are here for surgery.  *You will be directed to  the 2nd floor surgical waiting area.    AFTER OUTPATIENT SURGERY:  *A responsible adult MUST accompany you at the time of discharge and stay with you for 24 hours after your surgery.  *You may NOT drive yourself home after surgery.  *You may use a taxi or ride sharing service ([employer], Uber) to return home ONLY if you are accompanied by a friend or family member.  *Instructions for resuming your medications will be provided by your surgeon.       Home Preoperative Antibacterial Shower     What is a home preoperative antibacterial shower?  This shower is a way of cleaning the skin with a germ killing soap before surgery.  The soap contains chlorhexidine, commonly known as CHG.  CHG is a soap for your skin with germ killing ability.  Let your doctor know if you are allergic to chlorhexidine.    Why do I need to take a preoperative antibacterial shower?  Skin is not sterile.  It is best to try to make your skin as free of germs as possible before surgery.  Proper cleansing with a germ killing soap before surgery can lower the number of germs on your skin.  This helps to reduce the risk of infection at the surgical site.  Following the instructions listed below will help you prepare your skin for surgery.      How do I use the CHG skin cleanser?  Steps:  Begin using your CHG soap five days before your scheduled surgery on ________________________.    Days 1-4 Shower before bed:  Wash your face and genitals with your normal soap and rinse.           2.    Apply the CHG soap to a clean wet washcloth.  Turn the water off or move away                From the water spray to avoid premature rinsing of the CHG soap as you are applying.     3.   Lather your entire body from the neck down.  Do not use on your face or genitals.  4. Pay special attention to the area(s) where your incision(s) will be located unless they are on your face.  Avoid scrubbing your skin too hard.  The important point is to have the CHG soap sit on your skin  for 3 minutes.    When the 3 minutes are up, turn on the water and rinse the CHG soap off your body completely.   Pat yourself dry with a clean, freshly-laundered towel.  Dress in clean, freshly laundered night clothes.    Be sure to change bed sheets and blankets at least on the first night of CHG body wash use. May change linens every night of the above protocol for maximum benefit.   Day 5:  Last shower is the morning of surgery: Follow above Instructions.    NOTE:        *Keep CHG soap out of eyes and ear canals   *DO NOT wash with regular soap on your body after you have used the CHG soap solution  *DO NOT apply powders, lotions, or perfume.  *Deodorant may be used days 1-4, BUT NOT the day of surgery    Who should I contact if I have any questions regarding the use of CHG soap?  Call the [facility], Preadmission Testing at [phone] if you have any questions.              Patient Information: Pre-Operative Infection Prevention Measures     Why did I have my nose, under my arms and groin swabbed?  The purpose of the swab is to identify Staphylococcus aureus inside your nose or on your skin.  The swab was sent to the laboratory for culture.  A positive swab/culture for Staphylococcus aureus is called colonization or carriage.      What is Staphylococcus aureus?  Staphylococcus aureus, also known as “staph”, is a germ found on the skin or in the nose of healthy people.  Sometimes Staphylococcus aureus can get into the body and cause an infection.  This can be minor (such as pimples, boils or other skin problems).  It might also be serious (such as blood infection, pneumonia or a surgical site infection).    What is Staphylococcus aureus colonization or carriage?  Colonization or carriage means that a person has the germ but is not sick from it.  These bacteria can be spread on the hands or when breathing or sneezing.    How is Staphylococcus aureus spread?  It is most often  spread by close contact with a person or item that carries it.    What happens if my culture is positive for Staphylococcus aureus?  Your doctor/medical team will use this information to guide any antibiotic treatment which may be necessary.  Regardless of the culture results, we will clean the inside of your nose with a betadine swab just before you have your surgery.      Will I get an infection if I have Staphylococcus aureus in my nose or on my skin?  Anyone can get an infection with Staphylococcus aureus.  However, the best way to reduce your risk of infection is to follow the instructions provided to you for the use of your CHG soap and dental rinse.        Who should I contact if I have any questions?  Call the [facility], Preadmission Testing at [phone] if you have any questions.          Patient Information: Oral/Dental Rinse  **This is a prescription; pick it up at your preferred local pharmacy **  What is oral/dental rinse?   It is a mouthwash. It is a way of cleaning the mouth with a germ killing solution before your surgery.  The solution contains chlorhexidine, commonly known as CHG.   It is used inside the mouth to kill a bacteria known as Staphylococcus aureus.  Let your doctor know if you are allergic to Chlorhexidine.    Why do I need to use CHG oral/dental rinse?  The CHG oral/dental rinse helps to kill a bacteria in your mouth known a Staphylococcus aureus.     This reduces the risk of infection at the surgical site.      Using your CHG oral/dental rinse  STEPS:  Use your CHG oral/dental rinse after you brush your teeth the night before (at bedtime) and the morning of your surgery.  Follow all directions on your prescription label.    Use the cap on the container to measure 15ml (fill cap to fill line)  Swish (gargle if you can) the mouthwash in your mouth for at least 30 seconds, (do not to swallow) spit out  After you use your CHG rinse, do not rinse your  mouth with water, drink or eat.  Please refer to prescription label for the appropriate time to resume oral intake  Dental rinse comes in one size bottle: 473ml ~16oz.  You will have leftover    rinse, discard after this use.    What side effects might I have using the CHG oral/dental rinse?  CHG rinse will stick to plaque on the teeth.  Brush and floss just before use.  Teeth brushing will help avoid staining of plaque during use.    Who should I contact if I have questions about the CHG oral/dental rinse?  Please call [facility], Preadmission Testing at [phone] if you have any questions          Incentive Spirometer   You were provided with an incentive spirometer in [facility], please follow the below instructions.   You were not provided an incentive spirometer in [facility], please disregard the incentive spirometer instructions  What is an incentive spirometer?  An incentive spirometer is a device used before and after surgery to “exercise” your lungs.  It helps you to take deeper breaths to expand your lungs.  Below is an example of a basic incentive spirometer.  The device you receive may differ slightly but they all function the same.    Why do I need to use an incentive spirometer?  Using your incentive spirometer prepares your lungs for surgery and helps prevent lung problems after surgery.  How do I use my incentive spirometer?  When you're using your incentive spirometer, make sure to breathe through your mouth. If you breathe through your nose, the incentive spirometer won't work properly. You can hold your nose if you have trouble.  If you feel dizzy at any time, stop and rest. Try again at a later time.  Follow the steps below:  Set up your incentive spirometer, expand the flexible tubing and connect to the outlet.  Sit upright in a chair or bed. Hold the incentive spirometer at eye level.   Put the mouthpiece in your mouth and close your lips tightly around it. Slowly  breathe out (exhale) completely.  Breathe in (inhale) slowly through your mouth as deeply as you can. As you take a breath, you will see the piston rise inside the large column. While the piston rises, the indicator should move upwards. It should stay in between the 2 arrows (see Figure).  Try to get the piston as high as you can, while keeping the indicator between the arrows.   If the indicator doesn't stay between the arrows, you're breathing either too fast or too slow.  When you get it as high as you can, hold your breath for 10 seconds, or as long as possible. While you're holding your breath, the piston will slowly fall to the base of the spirometer.  Once the piston reaches the bottom of the spirometer, breathe out slowly through your mouth. Rest for a few seconds.  Repeat 10 times. Try to get the piston to the same level with each breath.  Repeat every hour while awake  You can carefully clean the outside of the mouthpiece with an alcohol wipe or soap and water.        Preoperative Deep Breathing Exercises  Why it is important to do deep breathing exercises before my surgery?  Deep breathing exercises strengthen your breathing muscles.  This helps you to recover after your surgery and decreases the chance of breathing complications.  How are the deep breathing exercises done?  Sit straight with your back supported.  Breathe in deeply and slowly through your nose. Your lower rib cage should expand and your abdomen may move forward.  Hold that breath for 3 to 5 seconds.  Breathe out through pursed lips, slowly and completely.  Rest and repeat 10 times every hour while awake.  Rest longer if you become dizzy or lightheaded.        Preoperative Brain Exercises    What are brain exercises?  A brain exercise is any activity that engages your thinking (cognitive) skills.    What types of activities are considered brain exercises?  Jigsaw puzzles, crossword puzzles, word jumble, memory games, word search, and many  more.  Many can be found free online or on your phone via a mobile kieran.    Why should I do brain exercises before my surgery?  More recent research has shown brain exercise before surgery can lower the risk of postoperative delirium (confusion) which can be especially important for older adults.  Patients who did brain exercises for 5 to 10 hours (total) for the 7-10 days before surgery, cut their risk of postoperative delirium in half up to 1 week after surgery.

## 2025-06-29 LAB — STAPHYLOCOCCUS SPEC CULT: NORMAL

## 2025-06-30 ENCOUNTER — HOSPITAL ENCOUNTER (OUTPATIENT)
Dept: CARDIOLOGY | Facility: HOSPITAL | Age: 75
Discharge: HOME | End: 2025-06-30
Payer: MEDICARE

## 2025-06-30 PROBLEM — M48.061 DEGENERATIVE LUMBAR SPINAL STENOSIS: Status: ACTIVE | Noted: 2025-06-30

## 2025-06-30 LAB
ATRIAL RATE: 56 BPM
P AXIS: 88 DEGREES
P OFFSET: 212 MS
P ONSET: 142 MS
PR INTERVAL: 158 MS
Q ONSET: 221 MS
QRS COUNT: 9 BEATS
QRS DURATION: 84 MS
QT INTERVAL: 428 MS
QTC CALCULATION(BAZETT): 413 MS
QTC FREDERICIA: 418 MS
R AXIS: 79 DEGREES
T AXIS: 76 DEGREES
T OFFSET: 435 MS
VENTRICULAR RATE: 56 BPM

## 2025-06-30 PROCEDURE — 93005 ELECTROCARDIOGRAM TRACING: CPT

## 2025-07-01 ENCOUNTER — HOSPITAL ENCOUNTER (INPATIENT)
Facility: HOSPITAL | Age: 75
LOS: 3 days | Discharge: HOME | End: 2025-07-04
Attending: ORTHOPAEDIC SURGERY | Admitting: ORTHOPAEDIC SURGERY
Payer: MEDICARE

## 2025-07-01 ENCOUNTER — ANESTHESIA EVENT (OUTPATIENT)
Dept: OPERATING ROOM | Facility: HOSPITAL | Age: 75
End: 2025-07-01
Payer: MEDICARE

## 2025-07-01 ENCOUNTER — ANESTHESIA (OUTPATIENT)
Dept: OPERATING ROOM | Facility: HOSPITAL | Age: 75
End: 2025-07-01
Payer: MEDICARE

## 2025-07-01 ENCOUNTER — APPOINTMENT (OUTPATIENT)
Dept: RADIOLOGY | Facility: HOSPITAL | Age: 75
End: 2025-07-01
Payer: MEDICARE

## 2025-07-01 DIAGNOSIS — M48.062 SPINAL STENOSIS, LUMBAR REGION WITH NEUROGENIC CLAUDICATION: ICD-10-CM

## 2025-07-01 DIAGNOSIS — M48.061 DEGENERATIVE LUMBAR SPINAL STENOSIS: Primary | ICD-10-CM

## 2025-07-01 LAB
ABO GROUP (TYPE) IN BLOOD: NORMAL
ANTIBODY SCREEN: NORMAL
RH FACTOR (ANTIGEN D): NORMAL

## 2025-07-01 PROCEDURE — 2500000001 HC RX 250 WO HCPCS SELF ADMINISTERED DRUGS (ALT 637 FOR MEDICARE OP): Performed by: ANESTHESIOLOGY

## 2025-07-01 PROCEDURE — 01NB0ZZ RELEASE LUMBAR NERVE, OPEN APPROACH: ICD-10-PCS | Performed by: ORTHOPAEDIC SURGERY

## 2025-07-01 PROCEDURE — 2500000004 HC RX 250 GENERAL PHARMACY W/ HCPCS (ALT 636 FOR OP/ED): Performed by: ORTHOPAEDIC SURGERY

## 2025-07-01 PROCEDURE — 72020 X-RAY EXAM OF SPINE 1 VIEW: CPT

## 2025-07-01 PROCEDURE — 2500000004 HC RX 250 GENERAL PHARMACY W/ HCPCS (ALT 636 FOR OP/ED)

## 2025-07-01 PROCEDURE — 1100000001 HC PRIVATE ROOM DAILY

## 2025-07-01 PROCEDURE — 3600000009 HC OR TIME - EACH INCREMENTAL 1 MINUTE - PROCEDURE LEVEL FOUR: Performed by: ORTHOPAEDIC SURGERY

## 2025-07-01 PROCEDURE — 99100 ANES PT EXTEME AGE<1 YR&>70: CPT | Performed by: ANESTHESIOLOGY

## 2025-07-01 PROCEDURE — 97161 PT EVAL LOW COMPLEX 20 MIN: CPT | Mod: GP

## 2025-07-01 PROCEDURE — 7100000002 HC RECOVERY ROOM TIME - EACH INCREMENTAL 1 MINUTE: Performed by: ORTHOPAEDIC SURGERY

## 2025-07-01 PROCEDURE — 2500000005 HC RX 250 GENERAL PHARMACY W/O HCPCS

## 2025-07-01 PROCEDURE — 36415 COLL VENOUS BLD VENIPUNCTURE: CPT | Performed by: ORTHOPAEDIC SURGERY

## 2025-07-01 PROCEDURE — 63047 LAM FACETEC & FORAMOT LUMBAR: CPT | Performed by: ORTHOPAEDIC SURGERY

## 2025-07-01 PROCEDURE — 2500000004 HC RX 250 GENERAL PHARMACY W/ HCPCS (ALT 636 FOR OP/ED): Mod: JZ | Performed by: ANESTHESIOLOGY

## 2025-07-01 PROCEDURE — 86850 RBC ANTIBODY SCREEN: CPT | Performed by: ORTHOPAEDIC SURGERY

## 2025-07-01 PROCEDURE — A63047 PR LAMINEC/FACETECT/FORAMIN,LUMBAR 1 SEG: Performed by: ANESTHESIOLOGY

## 2025-07-01 PROCEDURE — 3700000001 HC GENERAL ANESTHESIA TIME - INITIAL BASE CHARGE: Performed by: ORTHOPAEDIC SURGERY

## 2025-07-01 PROCEDURE — 2500000001 HC RX 250 WO HCPCS SELF ADMINISTERED DRUGS (ALT 637 FOR MEDICARE OP): Performed by: ORTHOPAEDIC SURGERY

## 2025-07-01 PROCEDURE — 97116 GAIT TRAINING THERAPY: CPT | Mod: GP

## 2025-07-01 PROCEDURE — A63047 PR LAMINEC/FACETECT/FORAMIN,LUMBAR 1 SEG: Performed by: NURSE ANESTHETIST, CERTIFIED REGISTERED

## 2025-07-01 PROCEDURE — 86901 BLOOD TYPING SEROLOGIC RH(D): CPT | Performed by: ORTHOPAEDIC SURGERY

## 2025-07-01 PROCEDURE — 7100000024 HC EXTENDED STAY RECOVERY PER MINUTE- PACU: Performed by: ORTHOPAEDIC SURGERY

## 2025-07-01 PROCEDURE — 2500000005 HC RX 250 GENERAL PHARMACY W/O HCPCS: Performed by: ORTHOPAEDIC SURGERY

## 2025-07-01 PROCEDURE — 97110 THERAPEUTIC EXERCISES: CPT | Mod: GP

## 2025-07-01 PROCEDURE — 9420000001 HC RT PATIENT EDUCATION 5 MIN

## 2025-07-01 PROCEDURE — 7100000001 HC RECOVERY ROOM TIME - INITIAL BASE CHARGE: Performed by: ORTHOPAEDIC SURGERY

## 2025-07-01 PROCEDURE — 3700000002 HC GENERAL ANESTHESIA TIME - EACH INCREMENTAL 1 MINUTE: Performed by: ORTHOPAEDIC SURGERY

## 2025-07-01 PROCEDURE — 63048 LAM FACETEC &FORAMOT EA ADDL: CPT | Performed by: ORTHOPAEDIC SURGERY

## 2025-07-01 PROCEDURE — 0QB00ZZ EXCISION OF LUMBAR VERTEBRA, OPEN APPROACH: ICD-10-PCS | Performed by: ORTHOPAEDIC SURGERY

## 2025-07-01 PROCEDURE — 2720000007 HC OR 272 NO HCPCS: Performed by: ORTHOPAEDIC SURGERY

## 2025-07-01 PROCEDURE — 2500000005 HC RX 250 GENERAL PHARMACY W/O HCPCS: Performed by: ANESTHESIOLOGY

## 2025-07-01 PROCEDURE — 3600000004 HC OR TIME - INITIAL BASE CHARGE - PROCEDURE LEVEL FOUR: Performed by: ORTHOPAEDIC SURGERY

## 2025-07-01 RX ORDER — LIDOCAINE HYDROCHLORIDE 10 MG/ML
0.1 INJECTION, SOLUTION EPIDURAL; INFILTRATION; INTRACAUDAL; PERINEURAL ONCE
Status: DISCONTINUED | OUTPATIENT
Start: 2025-07-01 | End: 2025-07-01 | Stop reason: HOSPADM

## 2025-07-01 RX ORDER — BACITRACIN 500 [USP'U]/G
OINTMENT TOPICAL AS NEEDED
Status: DISCONTINUED | OUTPATIENT
Start: 2025-07-01 | End: 2025-07-01 | Stop reason: HOSPADM

## 2025-07-01 RX ORDER — DEXMEDETOMIDINE HYDROCHLORIDE 4 UG/ML
INJECTION, SOLUTION INTRAVENOUS CONTINUOUS PRN
Status: DISCONTINUED | OUTPATIENT
Start: 2025-07-01 | End: 2025-07-01

## 2025-07-01 RX ORDER — DIPHENHYDRAMINE HYDROCHLORIDE 50 MG/ML
12.5 INJECTION, SOLUTION INTRAMUSCULAR; INTRAVENOUS EVERY 6 HOURS PRN
Status: DISCONTINUED | OUTPATIENT
Start: 2025-07-01 | End: 2025-07-04 | Stop reason: HOSPADM

## 2025-07-01 RX ORDER — PROCHLORPERAZINE 25 MG/1
25 SUPPOSITORY RECTAL EVERY 12 HOURS PRN
Status: DISCONTINUED | OUTPATIENT
Start: 2025-07-01 | End: 2025-07-01

## 2025-07-01 RX ORDER — NALOXONE HYDROCHLORIDE 0.4 MG/ML
0.2 INJECTION, SOLUTION INTRAMUSCULAR; INTRAVENOUS; SUBCUTANEOUS EVERY 5 MIN PRN
Status: DISCONTINUED | OUTPATIENT
Start: 2025-07-01 | End: 2025-07-04 | Stop reason: HOSPADM

## 2025-07-01 RX ORDER — PANTOPRAZOLE SODIUM 40 MG/1
40 TABLET, DELAYED RELEASE ORAL
Status: DISCONTINUED | OUTPATIENT
Start: 2025-07-02 | End: 2025-07-04 | Stop reason: HOSPADM

## 2025-07-01 RX ORDER — HYDROMORPHONE HYDROCHLORIDE 0.2 MG/ML
0.2 INJECTION INTRAMUSCULAR; INTRAVENOUS; SUBCUTANEOUS EVERY 4 HOURS PRN
Status: DISCONTINUED | OUTPATIENT
Start: 2025-07-01 | End: 2025-07-04 | Stop reason: HOSPADM

## 2025-07-01 RX ORDER — PROPOFOL 10 MG/ML
INJECTION, EMULSION INTRAVENOUS AS NEEDED
Status: DISCONTINUED | OUTPATIENT
Start: 2025-07-01 | End: 2025-07-01

## 2025-07-01 RX ORDER — PROCHLORPERAZINE MALEATE 10 MG
10 TABLET ORAL EVERY 6 HOURS PRN
Status: DISCONTINUED | OUTPATIENT
Start: 2025-07-01 | End: 2025-07-04 | Stop reason: HOSPADM

## 2025-07-01 RX ORDER — OXYCODONE HYDROCHLORIDE 5 MG/1
2.5 TABLET ORAL EVERY 4 HOURS PRN
Status: DISCONTINUED | OUTPATIENT
Start: 2025-07-01 | End: 2025-07-04 | Stop reason: HOSPADM

## 2025-07-01 RX ORDER — ALFUZOSIN HYDROCHLORIDE 10 MG/1
10 TABLET, EXTENDED RELEASE ORAL DAILY
COMMUNITY

## 2025-07-01 RX ORDER — POLYETHYLENE GLYCOL 3350 17 G/17G
17 POWDER, FOR SOLUTION ORAL DAILY
Status: DISCONTINUED | OUTPATIENT
Start: 2025-07-01 | End: 2025-07-04 | Stop reason: HOSPADM

## 2025-07-01 RX ORDER — ROCURONIUM BROMIDE 10 MG/ML
INJECTION, SOLUTION INTRAVENOUS AS NEEDED
Status: DISCONTINUED | OUTPATIENT
Start: 2025-07-01 | End: 2025-07-01

## 2025-07-01 RX ORDER — OXYCODONE HYDROCHLORIDE 5 MG/1
10 TABLET ORAL EVERY 4 HOURS PRN
Status: DISCONTINUED | OUTPATIENT
Start: 2025-07-01 | End: 2025-07-04 | Stop reason: HOSPADM

## 2025-07-01 RX ORDER — FENTANYL CITRATE 50 UG/ML
INJECTION, SOLUTION INTRAMUSCULAR; INTRAVENOUS AS NEEDED
Status: DISCONTINUED | OUTPATIENT
Start: 2025-07-01 | End: 2025-07-01

## 2025-07-01 RX ORDER — PHENYLEPHRINE 10 MG/250 ML(40 MCG/ML)IN 0.9 % SOD.CHLORIDE INTRAVENOUS
CONTINUOUS PRN
Status: DISCONTINUED | OUTPATIENT
Start: 2025-07-01 | End: 2025-07-01

## 2025-07-01 RX ORDER — DEXAMETHASONE SODIUM PHOSPHATE 10 MG/ML
10 INJECTION INTRAMUSCULAR; INTRAVENOUS EVERY 8 HOURS SCHEDULED
Status: COMPLETED | OUTPATIENT
Start: 2025-07-01 | End: 2025-07-02

## 2025-07-01 RX ORDER — OXYCODONE HYDROCHLORIDE 5 MG/1
5 TABLET ORAL EVERY 4 HOURS PRN
Status: DISCONTINUED | OUTPATIENT
Start: 2025-07-01 | End: 2025-07-04 | Stop reason: HOSPADM

## 2025-07-01 RX ORDER — PHENYLEPHRINE HYDROCHLORIDE 10 MG/ML
INJECTION INTRAVENOUS AS NEEDED
Status: DISCONTINUED | OUTPATIENT
Start: 2025-07-01 | End: 2025-07-01

## 2025-07-01 RX ORDER — PHENYTOIN SODIUM 100 MG/1
100 CAPSULE, EXTENDED RELEASE ORAL 3 TIMES DAILY
Status: DISCONTINUED | OUTPATIENT
Start: 2025-07-01 | End: 2025-07-04 | Stop reason: HOSPADM

## 2025-07-01 RX ORDER — ONDANSETRON HYDROCHLORIDE 2 MG/ML
INJECTION, SOLUTION INTRAVENOUS AS NEEDED
Status: DISCONTINUED | OUTPATIENT
Start: 2025-07-01 | End: 2025-07-01

## 2025-07-01 RX ORDER — CEFAZOLIN SODIUM 2 G/50ML
2 SOLUTION INTRAVENOUS EVERY 8 HOURS
Status: COMPLETED | OUTPATIENT
Start: 2025-07-01 | End: 2025-07-02

## 2025-07-01 RX ORDER — OXYCODONE HYDROCHLORIDE 5 MG/1
5 TABLET ORAL EVERY 4 HOURS PRN
Status: DISCONTINUED | OUTPATIENT
Start: 2025-07-01 | End: 2025-07-01 | Stop reason: SDUPTHER

## 2025-07-01 RX ORDER — ASPIRIN 81 MG/1
81 TABLET ORAL DAILY
Status: DISCONTINUED | OUTPATIENT
Start: 2025-07-02 | End: 2025-07-04 | Stop reason: HOSPADM

## 2025-07-01 RX ORDER — SODIUM CHLORIDE, SODIUM LACTATE, POTASSIUM CHLORIDE, CALCIUM CHLORIDE 600; 310; 30; 20 MG/100ML; MG/100ML; MG/100ML; MG/100ML
100 INJECTION, SOLUTION INTRAVENOUS CONTINUOUS
Status: DISCONTINUED | OUTPATIENT
Start: 2025-07-01 | End: 2025-07-01 | Stop reason: HOSPADM

## 2025-07-01 RX ORDER — CEFAZOLIN 1 G/1
INJECTION, POWDER, FOR SOLUTION INTRAVENOUS AS NEEDED
Status: DISCONTINUED | OUTPATIENT
Start: 2025-07-01 | End: 2025-07-01

## 2025-07-01 RX ORDER — DIPHENHYDRAMINE HCL 12.5MG/5ML
12.5 LIQUID (ML) ORAL EVERY 6 HOURS PRN
Status: DISCONTINUED | OUTPATIENT
Start: 2025-07-01 | End: 2025-07-04 | Stop reason: HOSPADM

## 2025-07-01 RX ORDER — METHOCARBAMOL 500 MG/1
750 TABLET, FILM COATED ORAL EVERY 8 HOURS PRN
Status: DISCONTINUED | OUTPATIENT
Start: 2025-07-01 | End: 2025-07-04 | Stop reason: HOSPADM

## 2025-07-01 RX ORDER — KETOROLAC TROMETHAMINE 30 MG/ML
INJECTION, SOLUTION INTRAMUSCULAR; INTRAVENOUS AS NEEDED
Status: DISCONTINUED | OUTPATIENT
Start: 2025-07-01 | End: 2025-07-01

## 2025-07-01 RX ORDER — GABAPENTIN 300 MG/1
600 CAPSULE ORAL 3 TIMES DAILY
Status: DISCONTINUED | OUTPATIENT
Start: 2025-07-01 | End: 2025-07-04 | Stop reason: HOSPADM

## 2025-07-01 RX ORDER — LIDOCAINE HYDROCHLORIDE 20 MG/ML
INJECTION, SOLUTION INFILTRATION; PERINEURAL AS NEEDED
Status: DISCONTINUED | OUTPATIENT
Start: 2025-07-01 | End: 2025-07-01

## 2025-07-01 RX ORDER — METOPROLOL SUCCINATE 25 MG/1
25 TABLET, EXTENDED RELEASE ORAL DAILY
Status: DISCONTINUED | OUTPATIENT
Start: 2025-07-01 | End: 2025-07-04 | Stop reason: HOSPADM

## 2025-07-01 RX ORDER — OXYCODONE HYDROCHLORIDE 5 MG/1
5 TABLET ORAL EVERY 4 HOURS PRN
Status: DISCONTINUED | OUTPATIENT
Start: 2025-07-01 | End: 2025-07-01 | Stop reason: HOSPADM

## 2025-07-01 RX ORDER — ONDANSETRON HYDROCHLORIDE 2 MG/ML
4 INJECTION, SOLUTION INTRAVENOUS ONCE AS NEEDED
Status: COMPLETED | OUTPATIENT
Start: 2025-07-01 | End: 2025-07-01

## 2025-07-01 RX ORDER — SODIUM CHLORIDE 0.9 G/100ML
INJECTION, SOLUTION IRRIGATION AS NEEDED
Status: DISCONTINUED | OUTPATIENT
Start: 2025-07-01 | End: 2025-07-01 | Stop reason: HOSPADM

## 2025-07-01 RX ORDER — METHOCARBAMOL 100 MG/ML
INJECTION, SOLUTION INTRAMUSCULAR; INTRAVENOUS AS NEEDED
Status: DISCONTINUED | OUTPATIENT
Start: 2025-07-01 | End: 2025-07-01

## 2025-07-01 RX ORDER — MIDAZOLAM HYDROCHLORIDE 1 MG/ML
INJECTION, SOLUTION INTRAMUSCULAR; INTRAVENOUS AS NEEDED
Status: DISCONTINUED | OUTPATIENT
Start: 2025-07-01 | End: 2025-07-01

## 2025-07-01 RX ORDER — ESMOLOL HYDROCHLORIDE 10 MG/ML
INJECTION INTRAVENOUS AS NEEDED
Status: DISCONTINUED | OUTPATIENT
Start: 2025-07-01 | End: 2025-07-01

## 2025-07-01 RX ORDER — PROCHLORPERAZINE EDISYLATE 5 MG/ML
10 INJECTION INTRAMUSCULAR; INTRAVENOUS EVERY 6 HOURS PRN
Status: DISCONTINUED | OUTPATIENT
Start: 2025-07-01 | End: 2025-07-04 | Stop reason: HOSPADM

## 2025-07-01 RX ADMIN — PROPOFOL 200 MG: 10 INJECTION, EMULSION INTRAVENOUS at 08:38

## 2025-07-01 RX ADMIN — OXYCODONE HYDROCHLORIDE 10 MG: 5 TABLET ORAL at 18:39

## 2025-07-01 RX ADMIN — ROCURONIUM BROMIDE 10 MG: 10 INJECTION, SOLUTION INTRAVENOUS at 10:02

## 2025-07-01 RX ADMIN — ESMOLOL HYDROCHLORIDE 50 MG: 10 INJECTION, SOLUTION INTRAVENOUS at 11:00

## 2025-07-01 RX ADMIN — ROCURONIUM BROMIDE 50 MG: 10 INJECTION, SOLUTION INTRAVENOUS at 08:39

## 2025-07-01 RX ADMIN — SODIUM CHLORIDE, POTASSIUM CHLORIDE, SODIUM LACTATE AND CALCIUM CHLORIDE: 600; 310; 30; 20 INJECTION, SOLUTION INTRAVENOUS at 08:33

## 2025-07-01 RX ADMIN — MIDAZOLAM HYDROCHLORIDE 2 MG: 1 INJECTION, SOLUTION INTRAMUSCULAR; INTRAVENOUS at 08:33

## 2025-07-01 RX ADMIN — POLYETHYLENE GLYCOL 3350 17 G: 17 POWDER, FOR SOLUTION ORAL at 16:11

## 2025-07-01 RX ADMIN — PHENYTOIN SODIUM 100 MG: 100 CAPSULE, EXTENDED RELEASE ORAL at 16:10

## 2025-07-01 RX ADMIN — GABAPENTIN 600 MG: 300 CAPSULE ORAL at 21:05

## 2025-07-01 RX ADMIN — PHENYLEPHRINE HYDROCHLORIDE 80 MCG: 10 INJECTION INTRAVENOUS at 09:34

## 2025-07-01 RX ADMIN — LIDOCAINE HYDROCHLORIDE 90 MG: 20 INJECTION, SOLUTION INFILTRATION; PERINEURAL at 08:37

## 2025-07-01 RX ADMIN — HYDROMORPHONE HYDROCHLORIDE 0.5 MG: 1 INJECTION, SOLUTION INTRAMUSCULAR; INTRAVENOUS; SUBCUTANEOUS at 11:15

## 2025-07-01 RX ADMIN — CEFAZOLIN SODIUM 2 G: 2 SOLUTION INTRAVENOUS at 16:11

## 2025-07-01 RX ADMIN — OXYCODONE HYDROCHLORIDE 5 MG: 5 TABLET ORAL at 14:14

## 2025-07-01 RX ADMIN — CARBOXYMETHYLCELLULOSE SODIUM 2 DROP: 5 SOLUTION/ DROPS OPHTHALMIC at 08:41

## 2025-07-01 RX ADMIN — FENTANYL CITRATE 100 MCG: 50 INJECTION, SOLUTION INTRAMUSCULAR; INTRAVENOUS at 08:37

## 2025-07-01 RX ADMIN — DEXAMETHASONE SODIUM PHOSPHATE 10 MG: 10 INJECTION, SOLUTION INTRAMUSCULAR; INTRAVENOUS at 16:11

## 2025-07-01 RX ADMIN — DEXAMETHASONE SODIUM PHOSPHATE 10 MG: 4 INJECTION, SOLUTION INTRA-ARTICULAR; INTRALESIONAL; INTRAMUSCULAR; INTRAVENOUS; SOFT TISSUE at 08:45

## 2025-07-01 RX ADMIN — DEXMEDETOMIDINE HYDROCHLORIDE 0.2 MCG/KG/HR: 4 INJECTION, SOLUTION INTRAVENOUS at 08:50

## 2025-07-01 RX ADMIN — ONDANSETRON 4 MG: 2 INJECTION INTRAMUSCULAR; INTRAVENOUS at 10:29

## 2025-07-01 RX ADMIN — HYDROMORPHONE HYDROCHLORIDE 0.5 MG: 1 INJECTION, SOLUTION INTRAMUSCULAR; INTRAVENOUS; SUBCUTANEOUS at 11:32

## 2025-07-01 RX ADMIN — ONDANSETRON 4 MG: 2 INJECTION, SOLUTION INTRAMUSCULAR; INTRAVENOUS at 11:15

## 2025-07-01 RX ADMIN — HYDROMORPHONE HYDROCHLORIDE 0.5 MG: 1 INJECTION, SOLUTION INTRAMUSCULAR; INTRAVENOUS; SUBCUTANEOUS at 11:49

## 2025-07-01 RX ADMIN — OXYCODONE HYDROCHLORIDE 10 MG: 5 TABLET ORAL at 23:26

## 2025-07-01 RX ADMIN — DEXAMETHASONE SODIUM PHOSPHATE 10 MG: 10 INJECTION, SOLUTION INTRAMUSCULAR; INTRAVENOUS at 21:05

## 2025-07-01 RX ADMIN — PHENYTOIN SODIUM 100 MG: 100 CAPSULE, EXTENDED RELEASE ORAL at 21:05

## 2025-07-01 RX ADMIN — Medication 2 L/MIN: at 11:04

## 2025-07-01 RX ADMIN — ROCURONIUM BROMIDE 20 MG: 10 INJECTION, SOLUTION INTRAVENOUS at 09:27

## 2025-07-01 RX ADMIN — GABAPENTIN 600 MG: 300 CAPSULE ORAL at 16:10

## 2025-07-01 RX ADMIN — CEFAZOLIN 2 G: 330 INJECTION, POWDER, FOR SOLUTION INTRAMUSCULAR; INTRAVENOUS at 08:42

## 2025-07-01 RX ADMIN — GLYCOPYRROLATE 0.2 MG: 0.2 INJECTION, SOLUTION INTRAMUSCULAR; INTRAVENOUS at 09:17

## 2025-07-01 RX ADMIN — PHENYLEPHRINE-NACL IV SOLUTION 10 MG/250ML-0.9% 0.2 MCG/KG/MIN: 10-0.9/25 SOLUTION at 08:47

## 2025-07-01 RX ADMIN — METHOCARBAMOL 1000 MG: 1000 INJECTION, SOLUTION INTRAMUSCULAR; INTRAVENOUS at 10:34

## 2025-07-01 RX ADMIN — ROCURONIUM BROMIDE 20 MG: 10 INJECTION, SOLUTION INTRAVENOUS at 08:58

## 2025-07-01 RX ADMIN — SUGAMMADEX 200 MG: 100 INJECTION, SOLUTION INTRAVENOUS at 10:55

## 2025-07-01 RX ADMIN — KETOROLAC TROMETHAMINE 15 MG: 30 INJECTION, SOLUTION INTRAMUSCULAR at 11:02

## 2025-07-01 SDOH — SOCIAL STABILITY: SOCIAL INSECURITY
ASK PARENT OR GUARDIAN: ARE THERE TIMES WHEN YOU, YOUR CHILD(REN), OR ANY MEMBER OF YOUR HOUSEHOLD FEEL UNSAFE, HARMED, OR THREATENED AROUND PERSONS WITH WHOM YOU KNOW OR LIVE?: NO

## 2025-07-01 SDOH — SOCIAL STABILITY: SOCIAL INSECURITY: WITHIN THE LAST YEAR, HAVE YOU BEEN HUMILIATED OR EMOTIONALLY ABUSED IN OTHER WAYS BY YOUR PARTNER OR EX-PARTNER?: NO

## 2025-07-01 SDOH — SOCIAL STABILITY: SOCIAL INSECURITY: ABUSE: ADULT

## 2025-07-01 SDOH — SOCIAL STABILITY: SOCIAL INSECURITY: WITHIN THE LAST YEAR, HAVE YOU BEEN AFRAID OF YOUR PARTNER OR EX-PARTNER?: NO

## 2025-07-01 SDOH — SOCIAL STABILITY: SOCIAL INSECURITY
WITHIN THE LAST YEAR, HAVE YOU BEEN KICKED, HIT, SLAPPED, OR OTHERWISE PHYSICALLY HURT BY YOUR PARTNER OR EX-PARTNER?: NO

## 2025-07-01 SDOH — SOCIAL STABILITY: SOCIAL INSECURITY: HAVE YOU HAD THOUGHTS OF HARMING ANYONE ELSE?: NO

## 2025-07-01 SDOH — SOCIAL STABILITY: SOCIAL INSECURITY: DO YOU FEEL UNSAFE GOING BACK TO THE PLACE WHERE YOU ARE LIVING?: NO

## 2025-07-01 SDOH — ECONOMIC STABILITY: INCOME INSECURITY
IN THE PAST 12 MONTHS HAS THE ELECTRIC, GAS, OIL, OR WATER COMPANY THREATENED TO SHUT OFF SERVICES IN YOUR HOME?: PATIENT DECLINED

## 2025-07-01 SDOH — ECONOMIC STABILITY: FOOD INSECURITY
WITHIN THE PAST 12 MONTHS, THE FOOD YOU BOUGHT JUST DIDN'T LAST AND YOU DIDN'T HAVE MONEY TO GET MORE.: PATIENT UNABLE TO ANSWER

## 2025-07-01 SDOH — SOCIAL STABILITY: SOCIAL INSECURITY: DOES ANYONE TRY TO KEEP YOU FROM HAVING/CONTACTING OTHER FRIENDS OR DOING THINGS OUTSIDE YOUR HOME?: NO

## 2025-07-01 SDOH — ECONOMIC STABILITY: FOOD INSECURITY
WITHIN THE PAST 12 MONTHS, YOU WORRIED THAT YOUR FOOD WOULD RUN OUT BEFORE YOU GOT THE MONEY TO BUY MORE.: PATIENT UNABLE TO ANSWER

## 2025-07-01 SDOH — SOCIAL STABILITY: SOCIAL INSECURITY
WITHIN THE LAST YEAR, HAVE YOU BEEN RAPED OR FORCED TO HAVE ANY KIND OF SEXUAL ACTIVITY BY YOUR PARTNER OR EX-PARTNER?: NO

## 2025-07-01 SDOH — SOCIAL STABILITY: SOCIAL INSECURITY: HAVE YOU HAD ANY THOUGHTS OF HARMING ANYONE ELSE?: NO

## 2025-07-01 SDOH — SOCIAL STABILITY: SOCIAL INSECURITY: ARE THERE ANY APPARENT SIGNS OF INJURIES/BEHAVIORS THAT COULD BE RELATED TO ABUSE/NEGLECT?: NO

## 2025-07-01 SDOH — SOCIAL STABILITY: SOCIAL INSECURITY: DO YOU FEEL ANYONE HAS EXPLOITED OR TAKEN ADVANTAGE OF YOU FINANCIALLY OR OF YOUR PERSONAL PROPERTY?: NO

## 2025-07-01 SDOH — ECONOMIC STABILITY: HOUSING INSECURITY: DO YOU FEEL UNSAFE GOING BACK TO THE PLACE WHERE YOU LIVE?: NO

## 2025-07-01 SDOH — HEALTH STABILITY: MENTAL HEALTH: CURRENT SMOKER: 0

## 2025-07-01 SDOH — SOCIAL STABILITY: SOCIAL INSECURITY: ARE YOU OR HAVE YOU BEEN THREATENED OR ABUSED PHYSICALLY, EMOTIONALLY, OR SEXUALLY BY ANYONE?: NO

## 2025-07-01 SDOH — SOCIAL STABILITY: SOCIAL INSECURITY: HAS ANYONE EVER THREATENED TO HURT YOUR FAMILY OR YOUR PETS?: NO

## 2025-07-01 ASSESSMENT — COGNITIVE AND FUNCTIONAL STATUS - GENERAL
PERSONAL GROOMING: A LITTLE
TURNING FROM BACK TO SIDE WHILE IN FLAT BAD: A LITTLE
STANDING UP FROM CHAIR USING ARMS: A LITTLE
PERSONAL GROOMING: A LITTLE
MOVING TO AND FROM BED TO CHAIR: A LITTLE
MOVING TO AND FROM BED TO CHAIR: A LITTLE
DRESSING REGULAR LOWER BODY CLOTHING: A LITTLE
TOILETING: A LITTLE
STANDING UP FROM CHAIR USING ARMS: A LITTLE
DRESSING REGULAR LOWER BODY CLOTHING: A LITTLE
DAILY ACTIVITIY SCORE: 18
MOVING FROM LYING ON BACK TO SITTING ON SIDE OF FLAT BED WITH BEDRAILS: A LITTLE
DRESSING REGULAR UPPER BODY CLOTHING: A LITTLE
EATING MEALS: A LITTLE
MOVING FROM LYING ON BACK TO SITTING ON SIDE OF FLAT BED WITH BEDRAILS: A LITTLE
DRESSING REGULAR UPPER BODY CLOTHING: A LITTLE
TURNING FROM BACK TO SIDE WHILE IN FLAT BAD: A LITTLE
WALKING IN HOSPITAL ROOM: A LITTLE
TOILETING: A LITTLE
HELP NEEDED FOR BATHING: A LITTLE
PATIENT BASELINE BEDBOUND: NO
MOVING FROM LYING ON BACK TO SITTING ON SIDE OF FLAT BED WITH BEDRAILS: A LITTLE
MOBILITY SCORE: 18
WALKING IN HOSPITAL ROOM: A LITTLE
STANDING UP FROM CHAIR USING ARMS: A LITTLE
TURNING FROM BACK TO SIDE WHILE IN FLAT BAD: A LITTLE
CLIMB 3 TO 5 STEPS WITH RAILING: A LITTLE
MOBILITY SCORE: 17
MOBILITY SCORE: 18
CLIMB 3 TO 5 STEPS WITH RAILING: A LOT
MOVING TO AND FROM BED TO CHAIR: A LITTLE
WALKING IN HOSPITAL ROOM: A LITTLE
DAILY ACTIVITIY SCORE: 19
HELP NEEDED FOR BATHING: A LITTLE
CLIMB 3 TO 5 STEPS WITH RAILING: A LITTLE

## 2025-07-01 ASSESSMENT — COLUMBIA-SUICIDE SEVERITY RATING SCALE - C-SSRS
1. IN THE PAST MONTH, HAVE YOU WISHED YOU WERE DEAD OR WISHED YOU COULD GO TO SLEEP AND NOT WAKE UP?: NO
6. HAVE YOU EVER DONE ANYTHING, STARTED TO DO ANYTHING, OR PREPARED TO DO ANYTHING TO END YOUR LIFE?: NO
2. HAVE YOU ACTUALLY HAD ANY THOUGHTS OF KILLING YOURSELF?: NO

## 2025-07-01 ASSESSMENT — PAIN SCALES - GENERAL
PAINLEVEL_OUTOF10: 5 - MODERATE PAIN
PAINLEVEL_OUTOF10: 3
PAINLEVEL_OUTOF10: 3
PAINLEVEL_OUTOF10: 5 - MODERATE PAIN
PAINLEVEL_OUTOF10: 1
PAINLEVEL_OUTOF10: 5 - MODERATE PAIN
PAINLEVEL_OUTOF10: 8
PAINLEVEL_OUTOF10: 8
PAINLEVEL_OUTOF10: 5 - MODERATE PAIN
PAINLEVEL_OUTOF10: 5 - MODERATE PAIN
PAINLEVEL_OUTOF10: 3
PAINLEVEL_OUTOF10: 5 - MODERATE PAIN
PAIN_LEVEL: 4
PAINLEVEL_OUTOF10: 3
PAINLEVEL_OUTOF10: 3
PAINLEVEL_OUTOF10: 4

## 2025-07-01 ASSESSMENT — PAIN - FUNCTIONAL ASSESSMENT

## 2025-07-01 ASSESSMENT — ACTIVITIES OF DAILY LIVING (ADL)
DRESSING YOURSELF: INDEPENDENT
FEEDING YOURSELF: INDEPENDENT
LACK_OF_TRANSPORTATION: NO
HEARING - LEFT EAR: FUNCTIONAL
HEARING - RIGHT EAR: FUNCTIONAL
JUDGMENT_ADEQUATE_SAFELY_COMPLETE_DAILY_ACTIVITIES: YES
ADL_ASSISTANCE: INDEPENDENT
PATIENT'S MEMORY ADEQUATE TO SAFELY COMPLETE DAILY ACTIVITIES?: YES
ADEQUATE_TO_COMPLETE_ADL: YES
BATHING: INDEPENDENT
GROOMING: INDEPENDENT
LACK_OF_TRANSPORTATION: NO
WALKS IN HOME: INDEPENDENT
TOILETING: INDEPENDENT

## 2025-07-01 ASSESSMENT — PAIN DESCRIPTION - DESCRIPTORS
DESCRIPTORS: DISCOMFORT
DESCRIPTORS: BURNING
DESCRIPTORS: DISCOMFORT

## 2025-07-01 ASSESSMENT — PATIENT HEALTH QUESTIONNAIRE - PHQ9
2. FEELING DOWN, DEPRESSED OR HOPELESS: NOT AT ALL
SUM OF ALL RESPONSES TO PHQ9 QUESTIONS 1 & 2: 0
1. LITTLE INTEREST OR PLEASURE IN DOING THINGS: NOT AT ALL

## 2025-07-01 ASSESSMENT — LIFESTYLE VARIABLES
HOW OFTEN DO YOU HAVE A DRINK CONTAINING ALCOHOL: NEVER
HOW MANY STANDARD DRINKS CONTAINING ALCOHOL DO YOU HAVE ON A TYPICAL DAY: PATIENT DOES NOT DRINK
AUDIT-C TOTAL SCORE: 0
SKIP TO QUESTIONS 9-10: 1
AUDIT-C TOTAL SCORE: 0
HOW OFTEN DO YOU HAVE 6 OR MORE DRINKS ON ONE OCCASION: NEVER

## 2025-07-01 ASSESSMENT — PAIN DESCRIPTION - LOCATION: LOCATION: BACK

## 2025-07-01 NOTE — OP NOTE
L2-L5 Lumbar Spine Decompression Operative Note     Date: 2025  OR Location: [facility] A OR    Name: Ashu Gomez, : 1950, Age: 75 y.o., MRN: [id number], Sex: male    Diagnosis  Pre-op Diagnosis      * Spinal stenosis, lumbar region with neurogenic claudication [M48.062] Post-op Diagnosis     * Spinal stenosis, lumbar region with neurogenic claudication [M48.062]     Procedures    Lumbar Decompression  L2-4 Laminectomies  Partial L5 Laminectomy  Partial L1 Laminectomy  Surgeons      * Sammy Moody - Primary    Resident/Fellow/Other Assistant:  Surgeons and Role:     * Everardo Edwards MD - Assisting    Staff:   Circulator: Nisha  Scrub Person: Ankit  Scrub Person: Marcell  Circulator: Osito Timmons Scrub: Osito Timmons Circulator: Jemima    Anesthesia Staff: Anesthesiologist: Jimmy Wolf MD  CRNA: TEMO Simmons-CRNA  SRNA: Katie Blackburn    Procedure Summary  Anesthesia: General  ASA: III  Estimated Blood Loss: 30 mL  Intra-op Medications:   Administrations occurring from 0800 to 1100 on 25:   Medication Name Total Dose   thrombin-bovine (JMI) 5,000 unit topical solution 10,000 Units   bacitracin ointment 1 Application   sodium chloride 0.9 % irrigation solution 1,000 mL   ceFAZolin (Ancef) 1 g 2 g   dexAMETHasone (Decadron) 4 mg/mL 10 mg   dexmedeTOMIDine 4 mcg/mL in 100 mL NS infusion 15.39 mcg   esmolol 10 mg/mL 50 mg   fentaNYL PF 0.05 mg/mL 100 mcg   glycopyrrolate PF (Robinul) injection 0.2 mg   LR bolus Cannot be calculated   lidocaine (Xylocaine) 2 % 90 mg   lubricating eye drops ophthalmic solution 2 drop   methocarbamol (Robaxin) injection 1,000 mg   midazolam (Versed) 1 mg/1 mL 2 mg   ondansetron 2 mg/mL 4 mg   phenylephrine (Billy-Synephrine) 10 mg/250 mL NS (40 mcg/mL) infusion 2.19 mg   phenylephrine (Billy-Synephrine) injection 80 mcg   propofol (Diprivan) infusion 10 mg/mL 200 mg   rocuronium 10 mg/mL 100 mg   sugammadex (Bridion) 200 mg/2 mL injection 200 mg               Anesthesia Record               Intraprocedure I/O Totals          Intake    Dexmedetomidine 3.85 mL    The total shown is the total volume documented since Anesthesia Start was filed.    LR bolus 1000.00 mL    Propofol Drip 20.00 mL    The total shown is the total volume documented since Anesthesia Start was filed.    Phenylephrine Drip 54.78 mL    The total shown is the total volume documented since Anesthesia Start was filed.    Total Intake 1078.63 mL       Output    Est. Blood Loss 50 mL    Total Output 50 mL       Net    Net Volume 1028.63 mL          Specimen: No specimens collected              Drains and/or Catheters:   Closed/Suction Drain Inferior Back Accordion  (Active)   Dressing Status Clean;Dry 07/01/25 1057       Clinical note: This man has developed disabling lumbar radiculopathy and neurogenic claudication.  He has severe multilevel spinal stenosis.  Conservative measures of failed to relieve his symptoms.  Currently he presents for surgery in the form of a lumbar decompression.    His lordosis is preserved without instability.    The rationale for the above-noted procedures have been discussed at length as have the risks benefits expectations limitations alternatives and potential complications.  He understands and wishes to proceed.    He was brought to the operating room.  A huddle was held, he was identified, antibiotics administered, sequential compression devices placed.  A general anesthetic was secured.  He was carefully placed in the prone position on the Matthew frame with all body prominences well-padded.  His back was prepped and draped in a sterile fashion.  A needle was placed percutaneously and an x-ray obtained to guide our incision.  A midline approach was made and carried down sharply through skin and subcutaneous tissue.  A subperiosteal dissection was carried out to the facet joints.  Second radiograph confirmed appropriate levels.  The spinous process and lamina L2-4 were  trimmed.  A high-speed bur was used to thin the lamina.  A plane was created between the underlying dura and the overlying hypertrophic ligamentum flavum and bone.  Severe central and lateral recess stenosis was noted at each level.  With great care a thorough central decompression was performed.  The lateral recesses were decompressed, taking care to avoid overly resecting the pars interarticularis or the facet joints.  It was necessary to perform a partial laminectomy on the inferior 50% of L1 given the degree of hypertrophic flavum with central and lateral recess stenosis.  The T12-L1 interspace however was not disrupted.  In a similar fashion the superior half of L5 was decompressed so that there was no ongoing central or lateral recess stenosis.  The neural foramen at each level were thoroughly decompressed.  At the completion of the decompression all neural elements were quite free.  The wound was copiously irrigated.  Very meticulous hemostasis was obtained.  A Hemovac drain was placed deep to the fascia.  The deep fascia was closed with interrupted #1 Vicryl, the subcutaneous tissue with interrupted 2-0 Vicryl and the skin with a 4-0 Vicryl in a running subcuticular fashion.  Steri-Strips and a dry sterile dressing were applied.  He was carefully turned into the supine position on his hospital bed, awakened and extubated and transferred to the recovery room in stable condition.    ** Dictated with voice recognition software and not immediately reviewed for errors in grammar and/or spelling **  Attending Attestation: I was present and scrubbed for the entire procedure.    Sammy Moody  Phone Number: [phone]

## 2025-07-01 NOTE — PERIOPERATIVE NURSING NOTE
Procedure: L2-L5 Lumbar Spine Decompression  Incision: mid, lower back incision dressed with steri strips, adaptic, bacitracin, fluffs, tape, hemovac  Anesthesia type (i.e. general, regional, MAC): general  Nerve block (if applicable): none  Estimated blood loss (EBL) in OR: 40 ml  Orientation/mental status: axo x4  IV site(s), and drips/fluids currently infusing right fa  PO status (last oral intake): sipping ice water in recovery. Diet is clear liquid  O2 requirements: 97% on 2lpm nc. Baseline is 97% on room air  Current pain level & last pain/nausea medication dose/time given:  1115 0.5mg Dilaudid  1115 4mg Zofran  1132 0.5mg Dilaudid  1149 0.5mg Dilaudid  Next antibiotic due @ Ancef at 1645  Last void/Burkett in place: voided 150ml in recovery using urinal in bed  Equipment sent with patient (i.e. Polar Cube, TENs unit, walker, crutches): suitcase, glasses  SCDs on (yes/no): yes  Belongings sent with patient: glasses, suitcase  Emergency contact (name, relationship, phone number): Charlette (wife) [phone]  PMH: arthritis, htn, IBS, peripheral neuropathy, seizure disorder, spinal stenosis    PSH: ankle fracture surgery, colonoscopy, joint replacement, tonsillectomy, EUS, left total hip replacement

## 2025-07-01 NOTE — BRIEF OP NOTE
Date: 2025  OR Location: [facility] OR    Name: Ashu Gomez, : 1950, Age: 75 y.o., MRN: [id number], Sex: male    Diagnosis  Pre-op Diagnosis      * Spinal stenosis, lumbar region with neurogenic claudication [M48.062] Post-op Diagnosis     * Spinal stenosis, lumbar region with neurogenic claudication [M48.062]     Procedures  L2-L5 Lumbar Spine Decompression  37186 - SC OLIVIER FACETECTOMY & FORAMOTOMY 1 VRT SGM LUMBAR    L2-L5 Lumbar Spine Decompression  77208 - SC OLIVIER FACETECTOMY&FORAMOT 1 VRT SGM EA ADDL SGM    SC OLIVIER FACETECTOMY&FORAMOT 1 VRT SGM EA ADDL SGM [32569]  Surgeons      * Sammy Moody - Primary    Resident/Fellow/Other Assistant:  Surgeons and Role:     * Everardo Edwards MD - Assisting    Staff:   Circulator: Nisha  Scrub Person: Ankit  Scrub Person: Marcell  Circulator: Osito  Relief Scrub: Osito Timmons Circulator: Jemima    Anesthesia Staff: Anesthesiologist: Jimmy Wolf MD  CRNA: TEMO Simmons-CRNA  SRNA: Katie Blackburn    Procedure Summary  Anesthesia: General  ASA: III  Estimated Blood Loss: 30mL  Intra-op Medications:   Administrations occurring from 0800 to 1100 on 25:   Medication Name Total Dose   thrombin-bovine (JMI) 5,000 unit topical solution 10,000 Units   bacitracin ointment 1 Application   sodium chloride 0.9 % irrigation solution 1,000 mL   ceFAZolin (Ancef) 1 g 2 g   dexAMETHasone (Decadron) 4 mg/mL 10 mg   dexmedeTOMIDine 4 mcg/mL in 100 mL NS infusion 15.39 mcg   esmolol 10 mg/mL 50 mg   fentaNYL PF 0.05 mg/mL 100 mcg   glycopyrrolate PF (Robinul) injection 0.2 mg   LR bolus Cannot be calculated   lidocaine (Xylocaine) 2 % 90 mg   lubricating eye drops ophthalmic solution 2 drop   methocarbamol (Robaxin) injection 1,000 mg   midazolam (Versed) 1 mg/1 mL 2 mg   ondansetron 2 mg/mL 4 mg   phenylephrine (Billy-Synephrine) 10 mg/250 mL NS (40 mcg/mL) infusion 2.19 mg   phenylephrine (Billy-Synephrine) injection 80 mcg   propofol (Diprivan) infusion 10 mg/mL  200 mg   rocuronium 10 mg/mL 100 mg   sugammadex (Bridion) 200 mg/2 mL injection 200 mg              Anesthesia Record               Intraprocedure I/O Totals          Intake    Dexmedetomidine 3.85 mL    The total shown is the total volume documented since Anesthesia Start was filed.    LR bolus 1000.00 mL    Propofol Drip 20.00 mL    The total shown is the total volume documented since Anesthesia Start was filed.    Phenylephrine Drip 54.78 mL    The total shown is the total volume documented since Anesthesia Start was filed.    Total Intake 1078.63 mL       Output    Est. Blood Loss 50 mL    Total Output 50 mL       Net    Net Volume 1028.63 mL          Specimen: No specimens collected     Findings: S/p L2-5 laminectomy    Complications:  None; patient tolerated the procedure well.     Disposition: PACU - hemodynamically stable.  Condition: stable  Specimens Collected: No specimens collected    Everardo Edwards MD  Orthopaedic Surgery PGY-2  [facility]

## 2025-07-01 NOTE — ANESTHESIA POSTPROCEDURE EVALUATION
Patient: Ashu Gomez    Procedure Summary       Date: 07/01/25 Room / Location: [facility] OR 07 / [facility] OR    Anesthesia Start: 0833 Anesthesia Stop: 1106    Procedure: L2-L5 Lumbar Spine Decompression (Spine Lumbar) Diagnosis:       Spinal stenosis, lumbar region with neurogenic claudication      (Spinal stenosis, lumbar region with neurogenic claudication [M48.062])    Surgeons: Sammy Moody MD Responsible Provider: Jimmy Wolf MD    Anesthesia Type: general ASA Status: 3            Anesthesia Type: general    Vitals Value Taken Time   BP 91/59 07/01/25 12:15   Temp 36.5 °C (97.7 °F) 07/01/25 12:15   Pulse 56 07/01/25 12:26   Resp 16 07/01/25 12:15   SpO2 96 % 07/01/25 12:26   Vitals shown include unfiled device data.    Anesthesia Post Evaluation    Patient location during evaluation: PACU  Patient participation: complete - patient participated  Level of consciousness: awake and alert  Pain score: 4  Pain management: adequate  Airway patency: patent  Cardiovascular status: acceptable  Respiratory status: acceptable  Hydration status: acceptable  Postoperative Nausea and Vomiting: none        No notable events documented.

## 2025-07-01 NOTE — PROGRESS NOTES
"Orthopaedic Surgery Progress Note    Subjective: Evaluated in immediate postoperative period. Pain well controlled considering recent surgery. Denies chest pain, shortness of breath, or fevers.    Objective:  /78   Pulse 53   Temp 36 °C (96.8 °F) (Temporal)   Resp 16   Ht 1.702 m (5' 7\")   Wt 63.7 kg (140 lb 6.9 oz)   SpO2 97%   BMI 21.99 kg/m²     Gen: arousable, NAD, appropriately conversational  Cardiac: RRR to peripheral palpation  Resp: nonlabored on RA  GI: soft, nondistended    MSK:  Spine Exam:  Drain in place holding suction, serosanginous output    L1: SILT       L2: SILT      Hip flexors 5/5 Left; 5/5 Right  L3: SILT      Knee extension 5/5 Left; 5/5 Right  L4: SILT      Tib Ant. (Dorsiflexion) 5/5 Left; 5/5 Right  L5: SILT      EHL 5/5 Left; 5/5 Right  S1: SILT      Plantarflexion 5/5 Left; 5/5 Right    No clonus    Assessment/Plan: 75 y.o. male s/p L2-5 on laminectomy with Dr. Moody.      Plan:  - Weight bearing: WBAT  - DVT ppx: SCDs, ASA 81  - Diet: Regular  - Pain: Tylenol, oxycodone 5/10  - Antibiotics: perioperative ancef 2g q8hr x3 doses  - Dressing: Foam tape/fluffs  - FEN: HLIV with good PO intake  - Bowel Regimen: Colace, senna, dulcolax  - PT/OT  - Pulm: Encourage IS  - Continue home medications  - No gómez    Dispo: pending PT/pain control/drain removal    Everardo Edwards MD  Orthopaedic Surgery PGY-2  [facility]  Available by Epic Chat    While inpatient, this patient will be followed by the Orthopedic Spine team. See below for contact information.    This patient will be followed by the Orthopaedic Spine service. Please page or Epic Chat the corresponding residents below with questions or concerns.     First call: Everardo Edwards, PGY2  Second call: Oneyda Alberto, PGY4     "

## 2025-07-01 NOTE — H&P
History Of Present Illness  Ashu Gomez is a 75 y.o. male presenting with severe lumbar radiculopathy.     Past Medical History  He has a past medical history of Arthritis, Chronic pain disorder, Colon polyp (25 yrs ago, get colonoscopies every 3 yrs), COVID-19 (Jan. 2021), Easy bruising (?), Fractures (40 yrs ago), Hypertension (5 yrs ago), Irritable bowel syndrome (Feb 2024), Joint pain, Peripheral neuropathy, Seizure disorder (Multi) (25 yrs. Diagnosed epilepsy), and Spinal stenosis.    Surgical History  He has a past surgical history that includes Other surgical history; Tonsillectomy (63 + yrs ago); Colonoscopy (Apr 2024); Upper gastrointestinal endoscopy (Apr. 2024); Ankle fracture surgery (1985?); and Joint replacement (May 2011).     Social History  He reports that he quit smoking about 5 years ago. His smoking use included cigarettes. He started smoking about 35 years ago. He has a 30 pack-year smoking history. His smokeless tobacco use includes snuff. He reports that he does not currently use alcohol. He reports that he does not use drugs.    Family History  Family History[1]     Allergies  Patient has no known allergies.    Review of Systems     Physical Exam     Last Recorded Vitals  There were no vitals taken for this visit.    Relevant Results      Scheduled medications  Scheduled Medications[2]  Continuous medications  Continuous Medications[3]  PRN medications  PRN Medications[4]  No results found for this or any previous visit (from the past 24 hours).    Assessment/Plan   Assessment & Plan  Spinal stenosis, lumbar region with neurogenic claudication    Degenerative lumbar spinal stenosis              I spent   minutes in the professional and overall care of this patient.      Sammy Moody MD         [1]   Family History  Problem Relation Name Age of Onset    No Known Problems Mother      Kidney disease Father      Heart disease Father      No Known Problems Sister      Heart attack  Brother     [2] [3] [4]

## 2025-07-01 NOTE — ANESTHESIA PREPROCEDURE EVALUATION
Patient: Ashu Gomez    Procedure Information       Anesthesia Start Date/Time: 07/01/25 0833    Procedure: L2-L5 Lumbar Spine Decompression (Spine Lumbar) - CPT: 63048 x3    Location: [facility] A OR  / [facility] A OR    Surgeons: Sammy Moody MD            Relevant Problems   Musculoskeletal   (+) Degenerative lumbar spinal stenosis   (+) Spinal stenosis, lumbar region with neurogenic claudication       Clinical information reviewed:   Tobacco  Allergies  Meds   Med Hx  Surg Hx   Fam Hx  Soc Hx        NPO Detail:  NPO/Void Status  Carbohydrate Drink Given Prior to Surgery? : N  Date of Last Liquid: 07/01/25  Time of Last Liquid: 0500  Date of Last Solid: 06/30/25  Time of Last Solid: 2200  Last Intake Type: Clear fluids  Time of Last Void: 0700         Physical Exam    Airway  Mallampati: I  TM distance: >3 FB  Neck ROM: full     Cardiovascular - normal exam   Dental - normal exam     Pulmonary - normal exam   Abdominal - normal exam           Anesthesia Plan    History of general anesthesia?: yes  History of complications of general anesthesia?: no    ASA 3     general     The patient is not a current smoker.  Patient was not previously instructed to abstain from smoking on day of procedure.  Patient did not smoke on day of procedure.    intravenous induction   Postoperative pain plan includes opioids.  Anesthetic plan and risks discussed with patient.  Use of blood products discussed with patient who.    Plan discussed with CRNA.

## 2025-07-01 NOTE — CARE PLAN
The patient's goals for the shift include      The clinical goals for the shift include pain control      Problem: Pain - Adult  Goal: Verbalizes/displays adequate comfort level or baseline comfort level  Flowsheets (Taken 7/1/2025 1722)  Verbalizes/displays adequate comfort level or baseline comfort level:   Encourage patient to monitor pain and request assistance   Assess pain using appropriate pain scale     Problem: Safety - Adult  Goal: Free from fall injury  Flowsheets (Taken 7/1/2025 1722)  Free from fall injury:   Instruct family/caregiver on patient safety   Based on caregiver fall risk screen, instruct family/caregiver to ask for assistance with transferring infant if caregiver noted to have fall risk factors     Problem: Discharge Planning  Goal: Discharge to home or other facility with appropriate resources  Flowsheets (Taken 7/1/2025 1722)  Discharge to home or other facility with appropriate resources:   Identify barriers to discharge with patient and caregiver   Arrange for interpreters to assist at discharge as needed   Arrange for needed discharge resources and transportation as appropriate     Problem: Chronic Conditions and Co-morbidities  Goal: Patient's chronic conditions and co-morbidity symptoms are monitored and maintained or improved  Flowsheets (Taken 7/1/2025 1722)  Care Plan - Patient's Chronic Conditions and Co-Morbidity Symptoms are Monitored and Maintained or Improved:   Update acute care plan with appropriate goals if chronic or comorbid symptoms are exacerbated and prevent overall improvement and discharge   Monitor and assess patient's chronic conditions and comorbid symptoms for stability, deterioration, or improvement   Collaborate with multidisciplinary team to address chronic and comorbid conditions and prevent exacerbation or deterioration

## 2025-07-01 NOTE — PROGRESS NOTES
Physical Therapy    Physical Therapy Evaluation & Treatment    Patient Name: Ashu Gomez  MRN: [id number]  Department: [place] [id number]  Room: [facility]  Today's Date: 7/1/2025   Time Calculation  Start Time: 1507  Stop Time: 1547  Time Calculation (min): 40 min    Assessment/Plan   PT Assessment  PT Assessment Results: Decreased strength, Decreased endurance, Impaired balance, Decreased mobility, Impaired sensation, Orthopedic restrictions, Pain  Rehab Prognosis: Good  Barriers to Discharge Home: No anticipated barriers  Evaluation/Treatment Tolerance: Patient tolerated treatment well  Medical Staff Made Aware: Yes  Strengths: Ability to acquire knowledge, Premorbid level of function, Attitude of self, Support of extended family/friends  Barriers to Participation: Other (Comment) (None identified)  End of Session Communication: Bedside nurse  Assessment Comment: Pt presents today with decreased functional BLE strength, balance, and fair activity tolerance. Pt would benefit from continued PT to review spinal precautions, progress ambulation, and attempt stair training to maximize pt safety with functional mobility at D/C. At D/C, pt is anticipated to benefit from low intensity therapy.  End of Session Patient Position: Up in chair, Alarm on   IP OR SWING BED PT PLAN  Inpatient or Swing Bed: Inpatient  PT Plan  Treatment/Interventions: Bed mobility, Transfer training, Gait training, Stair training, Balance training, Endurance training, Therapeutic exercise, Therapeutic activity, Home exercise program, Positioning, Strengthening, Postural re-education  PT Plan: Ongoing PT  PT Frequency: Daily  PT Discharge Recommendations: Low intensity level of continued care  Equipment Recommended upon Discharge: Wheeled walker  PT Recommended Transfer Status: Contact guard, Assistive device  PT - OK to Discharge: Yes (Per PT POC)      Subjective     PT Visit Info:  PT Received On: 07/01/25  General Visit Information:  General  Reason for  Referral: 75 y.o. M s/p L2-L5 lumbar spine decompression on 7/1/25.  Referred By: BINU YAO)  Past Medical History Relevant to Rehab: Arthritis, IBS, chronic pain disorder, peripheral neuropathy, seizure disorder, spinal stenosis.  Family/Caregiver Present: Yes  Caregiver Feedback: Wife and daughter present  Prior to Session Communication: Bedside nurse  Patient Position Received: Bed, 3 rail up, Alarm on  Preferred Learning Style: auditory, kinesthetic, verbal, visual  General Comment: Pt supine in bed requesting assistance to the bathroom upon PT arrival. Cleared to participate with RN, and agreeable to PT evaluation.  Home Living:  Home Living  Type of Home: House  Lives With: Spouse  Home Adaptive Equipment: Walker rolling or standard, Cane (FWW)  Home Layout: One level, Laundry in basement, Stairs to alternate level with rails, Full bath main level  Alternate Level Stairs-Rails: None  Alternate Level Stairs-Number of Steps: Full flight to basement  Home Access: Stairs to enter with rails  Entrance Stairs-Rails: Both  Entrance Stairs-Number of Steps: 5  Bathroom Shower/Tub: Walk-in shower  Bathroom Toilet: Handicapped height  Bathroom Equipment: Grab bars in shower, Built-in shower seat, Grab bars around toilet  Home Living Comments: Pt sleeps in a flat bed at baseline  Prior Level of Function:  Prior Function Per Pt/Caregiver Report  Level of [place]: Independent with ADLs and functional transfers, Independent with homemaking with ambulation  Receives Help From: Family, Neighbor (Daughter plans to stay [facility] for a few weeks)  ADL Assistance: Independent  Homemaking Assistance: Independent  Driving/Transportation: Independent  Ambulatory Assistance: Independent  Leisure: Pt enjoys completing yardwork  Hand Dominance: Right  Prior Function Comments: Pt denies recent falls. Pt also reports being the caregiver for wife at baseline  Precautions:  Precautions  Hearing/Visual Limitations: +Reading  glasses  Medical Precautions: Fall precautions, Spinal precautions  Post-Surgical Precautions: Spinal precautions  Precautions Comment: Hemovac drain    Objective   Pain:  Pain Assessment  Pain Assessment: 0-10  0-10 (Numeric) Pain Score: 5 - Moderate pain  Pain Type: Surgical pain  Pain Location: Back  Pain Orientation: Lower  Pain Interventions: Ambulation/increased activity, Repositioned (RN notified of pt pain rating)  Response to Interventions: Increase in pain (Pt reports 6/10 pain at the end of the session)  Cognition:  Cognition  Overall Cognitive Status: Within Functional Limits  Orientation Level: Oriented X4    General Assessments:  Activity Tolerance  Activity Tolerance Comments: Fair activity tolerance    Sensation  Sensation Comment: Pt reports numbness in the feet with 9 year chronicity    Coordination  Movements are Fluid and Coordinated: Yes    Postural Control  Postural Control: Within Functional Limits (With VC to maintain spinal precautions)  Head Control: Forward head posture in standing. Pt able to correct with VC    Static Sitting Balance  Static Sitting-Balance Support: Feet supported  Static Sitting-Level of Assistance: Close supervision  Static Sitting-Comment/Number of Minutes: At the EOB    Static Standing Balance  Static Standing-Balance Support: Bilateral upper extremity supported  Static Standing-Level of Assistance: Contact guard  Static Standing-Comment/Number of Minutes: +Gait belt with FWW support.  Dynamic Standing Balance  Dynamic Standing-Balance Support: Bilateral upper extremity supported  Dynamic Standing-Level of Assistance: Contact guard  Dynamic Standing-Balance: Turning  Dynamic Standing-Comments: +Gait belt with FWW support  Functional Assessments:  Stairs  Stairs: No  Extremity/Trunk Assessments:  RLE   RLE : Exceptions to WFL  Strength RLE  RLE Overall Strength: Greater than or equal to 3/5 as evidenced by functional mobility  LLE   LLE : Exceptions to WFL  Strength  LLE  LLE Overall Strength: Greater than or equal to 3/5 as evidenced by functional mobility  Treatments:  Therapeutic Activity  Therapeutic Activity Performed: Yes  Therapeutic Activity 1: Spinal precautions reviewed with pt. Pt able to state 0/3 spinal precautions at the begining of the session and 3/3 precautions at the end. Written handout provided detailing spinal precautions and log roll sequence for reference.    Bed Mobility  Bed Mobility: Yes  Bed Mobility 1  Bed Mobility 1: Supine to sitting, Log roll  Level of Assistance 1: Minimum assistance, Minimal verbal cues  Bed Mobility Comments 1: VC for log roll sequence to the left. CGA provided during transition into left side lying to refrain from trunk rotation. Assist provided to bring trunk into upirght sitting. HOB flat    Ambulation/Gait Training  Ambulation/Gait Training Performed: Yes  Ambulation/Gait Training 1  Surface 1: Level tile  Device 1: Rolling walker  Gait Support Devices: Gait belt  Assistance 1: Contact guard, Moderate verbal cues, Minimal tactile cues  Quality of Gait 1: Narrow base of support, Decreased step length, Forward flexed posture  Comments/Distance (ft) 1: 5 ft x 1, 2 ft x 2, 45 ft x 1 with a step-through pattern. Pt ambulates with decreased cynthia, step length, and flexion at trunk and head/neck. VC/TC for improved posture and to maintain spinal precautions. VC for proper spacing from FWW and for smaller turns for improved balance.  Transfers  Transfer: Yes  Transfer 1  Transfer From 1: Bed to  Transfer to 1: Stand  Technique 1: Sit to stand  Transfer Device 1: Walker, Gait belt  Transfer Level of Assistance 1: Minimum assistance, Minimal verbal cues  Trials/Comments 1: VC for B hand placement on the bed for BUE push to stand. Assist required to reach full stand.  Transfers 2  Transfer From 2: Stand to  Transfer to 2: Chair with arms  Technique 2: Stand to sit  Transfer Device 2: Walker, Gait belt  Transfer Level of Assistance 2:  Contact guard, Minimal verbal cues, Minimal tactile cues  Trials/Comments 2: VC for BLE positioning against the chair prior to initiating sitting. VC/TC for BUE reach for the armrests of the chair for controlled descent.  Outcome Measures:  [facility] Basic Mobility  Turning from your back to your side while in a flat bed without using bedrails: A little  Moving from lying on your back to sitting on the side of a flat bed without using bedrails: A little  Moving to and from bed to chair (including a wheelchair): A little  Standing up from a chair using your arms (e.g. wheelchair or bedside chair): A little  To walk in hospital room: A little  Climbing 3-5 steps with railing: A lot  Basic Mobility - Total Score: 17    Encounter Problems       Encounter Problems (Active)       Balance       Goal 1       Start:  07/01/25    Expected End:  07/15/25       Pt performs all sitting balance IND and standing balance with supervision using LRAD            Mobility       STG - Patient will ambulate       Start:  07/01/25    Expected End:  07/15/25       >150 ft IND using LRAD with proper gait mechanics         STG - Patient will ascend and descend a flight of stairs       Start:  07/01/25    Expected End:  07/15/25       With LRAD and CGA            PT Transfers       STG - Patient will perform bed mobility       Start:  07/01/25    Expected End:  07/15/25       Mod IND from a flat bed         STG - Patient will transfer sit to and from stand       Start:  07/01/25    Expected End:  07/15/25       Mod IND using LRAD with proper body mechanics                Education Documentation  Handouts, taught by Tai Canada PT at 7/1/2025  4:12 PM.  Learner: Significant Other, Family, Patient  Readiness: Acceptance  Method: Explanation, Demonstration, Handout  Response: Verbalizes Understanding    Precautions, taught by Tai Canada PT at 7/1/2025  4:12 PM.  Learner: Significant Other, Family, Patient  Readiness: Acceptance  Method:  Explanation, Demonstration, Handout  Response: Verbalizes Understanding    Body Mechanics, taught by Tai Canada PT at 7/1/2025  4:12 PM.  Learner: Significant Other, Family, Patient  Readiness: Acceptance  Method: Explanation, Demonstration, Handout  Response: Verbalizes Understanding    Mobility Training, taught by Tai Canada PT at 7/1/2025  4:12 PM.  Learner: Significant Other, Family, Patient  Readiness: Acceptance  Method: Explanation, Demonstration, Handout  Response: Verbalizes Understanding

## 2025-07-02 PROCEDURE — 97530 THERAPEUTIC ACTIVITIES: CPT | Mod: GO | Performed by: OCCUPATIONAL THERAPIST

## 2025-07-02 PROCEDURE — 97116 GAIT TRAINING THERAPY: CPT | Mod: GP,CQ | Performed by: PHYSICAL THERAPY ASSISTANT

## 2025-07-02 PROCEDURE — 97530 THERAPEUTIC ACTIVITIES: CPT | Mod: GP,CQ | Performed by: PHYSICAL THERAPY ASSISTANT

## 2025-07-02 PROCEDURE — 1100000001 HC PRIVATE ROOM DAILY

## 2025-07-02 PROCEDURE — 2500000004 HC RX 250 GENERAL PHARMACY W/ HCPCS (ALT 636 FOR OP/ED): Performed by: ORTHOPAEDIC SURGERY

## 2025-07-02 PROCEDURE — 97165 OT EVAL LOW COMPLEX 30 MIN: CPT | Mod: GO | Performed by: OCCUPATIONAL THERAPIST

## 2025-07-02 PROCEDURE — 2500000001 HC RX 250 WO HCPCS SELF ADMINISTERED DRUGS (ALT 637 FOR MEDICARE OP): Performed by: ORTHOPAEDIC SURGERY

## 2025-07-02 PROCEDURE — 97535 SELF CARE MNGMENT TRAINING: CPT | Mod: GO | Performed by: OCCUPATIONAL THERAPIST

## 2025-07-02 PROCEDURE — 2500000001 HC RX 250 WO HCPCS SELF ADMINISTERED DRUGS (ALT 637 FOR MEDICARE OP)

## 2025-07-02 RX ADMIN — PHENYTOIN SODIUM 100 MG: 100 CAPSULE, EXTENDED RELEASE ORAL at 20:20

## 2025-07-02 RX ADMIN — ASPIRIN 81 MG: 81 TABLET, DELAYED RELEASE ORAL at 08:22

## 2025-07-02 RX ADMIN — OXYCODONE HYDROCHLORIDE 10 MG: 5 TABLET ORAL at 10:53

## 2025-07-02 RX ADMIN — PHENYTOIN SODIUM 100 MG: 100 CAPSULE, EXTENDED RELEASE ORAL at 08:23

## 2025-07-02 RX ADMIN — CEFAZOLIN SODIUM 2 G: 2 SOLUTION INTRAVENOUS at 00:09

## 2025-07-02 RX ADMIN — GABAPENTIN 600 MG: 300 CAPSULE ORAL at 08:22

## 2025-07-02 RX ADMIN — POLYETHYLENE GLYCOL 3350 17 G: 17 POWDER, FOR SOLUTION ORAL at 08:22

## 2025-07-02 RX ADMIN — METOPROLOL SUCCINATE 25 MG: 25 TABLET, EXTENDED RELEASE ORAL at 08:22

## 2025-07-02 RX ADMIN — METHOCARBAMOL 750 MG: 500 TABLET ORAL at 08:23

## 2025-07-02 RX ADMIN — GABAPENTIN 600 MG: 300 CAPSULE ORAL at 20:19

## 2025-07-02 RX ADMIN — PHENYTOIN SODIUM 100 MG: 100 CAPSULE, EXTENDED RELEASE ORAL at 14:53

## 2025-07-02 RX ADMIN — GABAPENTIN 600 MG: 300 CAPSULE ORAL at 14:53

## 2025-07-02 RX ADMIN — CEFAZOLIN SODIUM 2 G: 2 SOLUTION INTRAVENOUS at 08:23

## 2025-07-02 RX ADMIN — DEXAMETHASONE SODIUM PHOSPHATE 10 MG: 10 INJECTION, SOLUTION INTRAMUSCULAR; INTRAVENOUS at 06:09

## 2025-07-02 RX ADMIN — OXYCODONE HYDROCHLORIDE 10 MG: 5 TABLET ORAL at 06:09

## 2025-07-02 RX ADMIN — PANTOPRAZOLE SODIUM 40 MG: 40 TABLET, DELAYED RELEASE ORAL at 06:09

## 2025-07-02 RX ADMIN — OXYCODONE HYDROCHLORIDE 10 MG: 5 TABLET ORAL at 14:53

## 2025-07-02 RX ADMIN — OXYCODONE HYDROCHLORIDE 10 MG: 5 TABLET ORAL at 20:20

## 2025-07-02 ASSESSMENT — COGNITIVE AND FUNCTIONAL STATUS - GENERAL
DRESSING REGULAR LOWER BODY CLOTHING: A LITTLE
TOILETING: A LITTLE
CLIMB 3 TO 5 STEPS WITH RAILING: A LITTLE
MOVING TO AND FROM BED TO CHAIR: A LITTLE
DAILY ACTIVITIY SCORE: 19
HELP NEEDED FOR BATHING: A LITTLE
WALKING IN HOSPITAL ROOM: A LITTLE
PERSONAL GROOMING: A LITTLE
DRESSING REGULAR LOWER BODY CLOTHING: A LITTLE
TOILETING: A LITTLE
DAILY ACTIVITIY SCORE: 23
TURNING FROM BACK TO SIDE WHILE IN FLAT BAD: A LITTLE
TOILETING: A LITTLE
MOVING TO AND FROM BED TO CHAIR: A LITTLE
DRESSING REGULAR UPPER BODY CLOTHING: A LITTLE
CLIMB 3 TO 5 STEPS WITH RAILING: A LITTLE
MOVING FROM LYING ON BACK TO SITTING ON SIDE OF FLAT BED WITH BEDRAILS: A LITTLE
STANDING UP FROM CHAIR USING ARMS: A LITTLE
MOBILITY SCORE: 21
WALKING IN HOSPITAL ROOM: A LITTLE
STANDING UP FROM CHAIR USING ARMS: A LITTLE
MOBILITY SCORE: 21
MOBILITY SCORE: 18
CLIMB 3 TO 5 STEPS WITH RAILING: A LITTLE
WALKING IN HOSPITAL ROOM: A LITTLE
HELP NEEDED FOR BATHING: A LITTLE
DAILY ACTIVITIY SCORE: 21

## 2025-07-02 ASSESSMENT — PAIN SCALES - GENERAL
PAINLEVEL_OUTOF10: 7
PAINLEVEL_OUTOF10: 8
PAINLEVEL_OUTOF10: 4
PAINLEVEL_OUTOF10: 2
PAINLEVEL_OUTOF10: 7
PAINLEVEL_OUTOF10: 1
PAINLEVEL_OUTOF10: 1
PAINLEVEL_OUTOF10: 6
PAINLEVEL_OUTOF10: 7
PAINLEVEL_OUTOF10: 3
PAINLEVEL_OUTOF10: 3

## 2025-07-02 ASSESSMENT — PAIN - FUNCTIONAL ASSESSMENT
PAIN_FUNCTIONAL_ASSESSMENT: 0-10

## 2025-07-02 ASSESSMENT — PAIN DESCRIPTION - LOCATION
LOCATION: BACK

## 2025-07-02 ASSESSMENT — PAIN DESCRIPTION - DESCRIPTORS: DESCRIPTORS: ACHING

## 2025-07-02 ASSESSMENT — ACTIVITIES OF DAILY LIVING (ADL)
LACK_OF_TRANSPORTATION: NO
ADL_ASSISTANCE: INDEPENDENT
HOME_MANAGEMENT_TIME_ENTRY: 12

## 2025-07-02 NOTE — PROGRESS NOTES
07/02/25 1232   Discharge Planning   Living Arrangements Spouse/significant other   Support Systems Spouse/significant other;Children;Family members   Assistance Needed Independent   Type of Residence Private residence   Number of Stairs to Enter Residence 5   Number of Stairs Within Residence 12   Home or Post Acute Services None   Expected Discharge Disposition Home   Does the patient need discharge transport arranged? No   Financial Resource Strain   How hard is it for you to pay for the very basics like food, housing, medical care, and heating? Not very   Housing Stability   In the last 12 months, was there a time when you were not able to pay the mortgage or rent on time? N   In the past 12 months, how many times have you moved where you were living? 0   At any time in the past 12 months, were you homeless or living in a shelter (including now)? N   Transportation Needs   In the past 12 months, has lack of transportation kept you from medical appointments or from getting medications? no   In the past 12 months, has lack of transportation kept you from meetings, work, or from getting things needed for daily living? No   Stroke Family Assessment   Stroke Family Assessment Needed No   Intensity of Service   Intensity of Service 0-30 min     Patient is POD 1 for a L2-5 spine decompression.  PLAN/BARRIER: PT, meds to beds, hemovac  DISP: home  HHC; none  O2: none  DME: none  WOUNDS: surgical  ADOD: 1-2 days  Patient has hired help one day a week for cleaning and they might be able to come more if he asks.  He is interested in private help initially once discharged. [facility] to provide an agency list for him.  Patient's daughter from [place] will be staying with him at discharge and will be the one to transport him home.  Marline Brock RN

## 2025-07-02 NOTE — NURSING NOTE
Interdisciplinary team present: NP, PT, NM, CC, SW, Orthopedic Coordinator, and bedside RN.  Pain - controlled  Nausea - none  Discharge barrier - hemovac  Discharge plan - Home   Discharge date/time - tomorrow or Friday

## 2025-07-02 NOTE — CARE PLAN
The patient's goals for the shift include      The clinical goals for the shift include patient pain to be well controlled      Problem: Pain - Adult  Goal: Verbalizes/displays adequate comfort level or baseline comfort level  Outcome: Progressing     Problem: Safety - Adult  Goal: Free from fall injury  Outcome: Progressing     Problem: Discharge Planning  Goal: Discharge to home or other facility with appropriate resources  Outcome: Progressing     Problem: Chronic Conditions and Co-morbidities  Goal: Patient's chronic conditions and co-morbidity symptoms are monitored and maintained or improved  Outcome: Progressing     Problem: Nutrition  Goal: Nutrient intake appropriate for maintaining nutritional needs  Outcome: Progressing

## 2025-07-02 NOTE — PROGRESS NOTES
Physical Therapy    Physical Therapy Treatment    Patient Name: Ashu Gomez  MRN: [id number]  Department: [place] [address]  Room: [address] [place] [address]  Today's Date: 7/2/2025  Time Calculation  Start Time: 0928  Stop Time: 1013  Time Calculation (min): 45 min         Assessment/Plan   PT Assessment  Rehab Prognosis: Good  Barriers to Discharge Home: No anticipated barriers  Evaluation/Treatment Tolerance: Patient tolerated treatment well  End of Session Communication: Bedside nurse  Assessment Comment:  (pt agreeable to tx.)  End of Session Patient Position: Up in chair, Alarm on  PT Plan  Inpatient/Swing Bed or Outpatient: Inpatient  PT Plan  Treatment/Interventions: Bed mobility, Transfer training, Gait training, Stair training, Therapeutic activity  PT Plan: Ongoing PT  PT Frequency: Daily  PT Discharge Recommendations: Low intensity level of continued care  Equipment Recommended upon Discharge: Wheeled walker  PT Recommended Transfer Status: Contact guard, Assistive device  PT - OK to Discharge:  (per PT POC)    PT Visit Info:  PT Received On: 07/02/25     General Visit Information:   General  Reason for Referral: 75 y.o. M s/p L2-L5 lumbar spine decompression on 7/1/25.  Referred By: BINU YAO)  Family/Caregiver Present: Yes  Caregiver Feedback:  (multiple family members in room)  Prior to Session Communication: Bedside nurse  Patient Position Received: Up in chair, Alarm on  Preferred Learning Style: auditory, kinesthetic, verbal, visual  General Comment:  (pt agreeable to tx)    Subjective   Precautions:  Precautions  Medical Precautions: Fall precautions, Spinal precautions  Post-Surgical Precautions: Spinal precautions            Objective   Pain:  Pain Assessment  Pain Assessment: 0-10  0-10 (Numeric) Pain Score: 3  Pain Type: Surgical pain  Pain Location: Back  Pain Orientation: Lower  Cognition:  Cognition  Orientation Level: Oriented X4  Coordination:     Postural Control:     Extremity/Trunk Assessments:    Activity  Tolerance:     Treatments:       Bed Mobility  Bed Mobility: Yes  Bed Mobility 1  Bed Mobility 1: Supine to sitting, Sitting to supine, Log roll  Level of Assistance 1: Contact guard, Minimal verbal cues, Minimal tactile cues  Bed Mobility Comments 1:  (pt req min cues to facilitate transfer)    Ambulation/Gait Training  Ambulation/Gait Training Performed: Yes  Ambulation/Gait Training 1  Surface 1: Level tile  Device 1: Rolling walker  Gait Support Devices: Gait belt  Assistance 1: Contact guard, Minimal verbal cues, Minimal tactile cues  Quality of Gait 1: Narrow base of support, Decreased step length, Forward flexed posture  Comments/Distance (ft) 1: approx 75ft x 2 (pt demo slwo short step throguh pattern,cues for upright posture and balance.)  Transfers  Transfer: Yes  Transfer 1  Transfer From 1: Sit to, Stand to  Technique 1: Sit to stand, Stand to sit  Transfer Device 1: Walker, Gait belt  Transfer Level of Assistance 1: Contact guard, Minimal verbal cues, Minimal tactile cues  Trials/Comments 1: 4 trials (pt req min cues for proper hand placement.)    Outcome Measures:  [facility] Basic Mobility  Turning from your back to your side while in a flat bed without using bedrails: A little  Moving from lying on your back to sitting on the side of a flat bed without using bedrails: A little  Moving to and from bed to chair (including a wheelchair): A little  Standing up from a chair using your arms (e.g. wheelchair or bedside chair): A little  To walk in hospital room: A little  Climbing 3-5 steps with railing: A little  Basic Mobility - Total Score: 18    Education Documentation  Handouts, taught by Ivan Tavares PTA at 7/2/2025  1:35 PM.  Learner: Patient  Readiness: Acceptance  Method: Explanation  Response: Needs Reinforcement    Precautions, taught by Iavn Tavares PTA at 7/2/2025  1:35 PM.  Learner: Patient  Readiness: Acceptance  Method: Explanation  Response: Needs Reinforcement    Body  Mechanics, taught by Ivan Tavares PTA at 7/2/2025  1:35 PM.  Learner: Patient  Readiness: Acceptance  Method: Explanation  Response: Needs Reinforcement    Mobility Training, taught by Ivan Tavares PTA at 7/2/2025  1:35 PM.  Learner: Patient  Readiness: Acceptance  Method: Explanation  Response: Needs Reinforcement    Education Comments  No comments found.        OP EDUCATION:       Encounter Problems       Encounter Problems (Active)       Balance       Goal 1 (Progressing)       Start:  07/01/25    Expected End:  07/15/25       Pt performs all sitting balance IND and standing balance with supervision using LRAD            Mobility       STG - Patient will ambulate (Progressing)       Start:  07/01/25    Expected End:  07/15/25       >150 ft IND using LRAD with proper gait mechanics         STG - Patient will ascend and descend a flight of stairs (Progressing)       Start:  07/01/25    Expected End:  07/15/25       With LRAD and CGA            PT Transfers       STG - Patient will perform bed mobility (Progressing)       Start:  07/01/25    Expected End:  07/15/25       Mod IND from a flat bed         STG - Patient will transfer sit to and from stand (Progressing)       Start:  07/01/25    Expected End:  07/15/25       Mod IND using LRAD with proper body mechanics

## 2025-07-02 NOTE — PROGRESS NOTES
Occupational Therapy    Evaluation/Treatment    Patient Name: Ashu Gomez  MRN: [id number]  Department: [facility] A 7  Room: [facility]73[facility]  Today's Date: 07/02/25  Time Calculation  Start Time: 1154  Stop Time: 1235  Time Calculation (min): 41 min       Assessment:  OT Assessment: Pt presents to OT this date s/p lumbar spine surgery and demos decreased balance, strength, endurance and safety awareness with new spinal precautions and increased pain resulting in decreased safety & independence with ADLs/IADLs. Pt requires skilled OT services to address above deficits to safely return to PLOF.  Prognosis: Good  Barriers to Discharge Home: No anticipated barriers  Evaluation/Treatment Tolerance: Patient limited by fatigue, Patient limited by pain  Medical Staff Made Aware: Yes  End of Session Communication: Bedside nurse  End of Session Patient Position: Up in chair, Alarm on  OT Assessment Results: Decreased ADL status, Decreased upper extremity strength, Decreased safe judgment during ADL, Decreased endurance, Decreased functional mobility, Decreased IADLs, Decreased trunk control for functional activities, Decreased sensation  Prognosis: Good  Evaluation/Treatment Tolerance: Patient limited by fatigue, Patient limited by pain  Medical Staff Made Aware: Yes  Strengths: Premorbid level of function, Ability to acquire knowledge, Attitude of self, Access to adaptive/assistive products, Housing layout  Barriers to Participation: Comorbidities  Plan:  Treatment Interventions: ADL retraining, Functional transfer training, UE strengthening/ROM, Endurance training, Patient/family training, Equipment evaluation/education, Compensatory technique education  OT Frequency: 3 times per week  OT Discharge Recommendations: Low intensity level of continued care  Equipment Recommended upon Discharge:  (TBD)  OT Recommended Transfer Status: Assist of 1  OT - OK to Discharge: Yes (OT POC established this date)  Treatment Interventions: ADL  retraining, Functional transfer training, UE strengthening/ROM, Endurance training, Patient/family training, Equipment evaluation/education, Compensatory technique education    Subjective     OT Visit Info:  OT Received On: 07/02/25  General Visit Info:   General  Reason for Referral: Pt is POD#1 L2-L5 lumbar spine decompression.  Referred By: BINU YAO)  Past Medical History Relevant to Rehab: Arthritis, IBS, chronic pain disorder, peripheral neuropathy, seizure disorder, spinal stenosis.  Family/Caregiver Present: Yes  Caregiver Feedback: daughter and son present  Prior to Session Communication: Bedside nurse  Patient Position Received: Up in chair, Alarm on  Preferred Learning Style: auditory, kinesthetic, verbal, visual  General Comment: Pt seated in chair upon arrival and agreeable to OT eval. Pt fully participatory.   Precautions:  Hearing/Visual Limitations: +Reading glasses  Medical Precautions: Fall precautions, Spinal precautions  Post-Surgical Precautions: Spinal precautions  Precautions Comment: hemovac drain            Pain:  Pain Assessment  Pain Assessment: 0-10  0-10 (Numeric) Pain Score: 6  Pain Type: Surgical pain  Pain Location: Back  Pain Orientation: Lower  Pain Interventions: Ambulation/increased activity, Repositioned (RN noitifed)    Objective   Cognition:  Overall Cognitive Status: Within Functional Limits  Orientation Level: Oriented X4  Insight: Mild  Impulsive: Within functional limits  Processing Speed: Within funtional limits           Home Living:  Type of Home: House  Lives With: Spouse  Home Adaptive Equipment: Walker rolling or standard, Cane, Reacher, Sock aid  Home Layout: One level, Laundry in basement, Stairs to alternate level with rails, Full bath main level  Alternate Level Stairs-Rails: None  Alternate Level Stairs-Number of Steps: full flight to basement  Home Access: Stairs to enter with rails  Entrance Stairs-Rails: Both  Entrance Stairs-Number of Steps: 5  Bathroom  Shower/Tub: Walk-in shower  Bathroom Toilet: Handicapped height  Bathroom Equipment: Grab bars in shower, Built-in shower seat, Grab bars around toilet  Prior Function:  Level of Armstrong: Independent with ADLs and functional transfers, Independent with homemaking with ambulation  Receives Help From: Family, Neighbor (Daughter plans to stay [facility] for a few weeks; son available to assist as well)  ADL Assistance: Independent  Homemaking Assistance: Independent  Driving/Transportation: Independent  Ambulatory Assistance: Independent  Hand Dominance: Right  Prior Function Comments: Pt denies recent falls. Pt also reports being the caregiver for wife at baseline       ADL:  Eating Assistance: Independent (anticipated)  Grooming Deficit: Setup, Wash/dry hands  LE Dressing Assistance:  (Min A to don/doff socks and shoes using AE, CGA to don pants using reacher, cues to maintain spinal precautions)  Activities of Daily Living:      Grooming  Grooming Level of Assistance: Setup  Grooming Where Assessed: Chair  Grooming Comments: pt performed hand hygiene         LE Dressing  LE Dressing: Yes  LE Dressing Adaptive Equipment: Reacher, Sock aide, Shoe horn  Pants Level of Assistance: Contact guard, Minimal verbal cues  Sock Level of Assistance: Minimum assistance, Minimal verbal cues  Shoe Level of Assistance: Minimum assistance, Minimal verbal cues  LE Dressing Where Assessed: Chair  LE Dressing Comments: cues for use of AE and compensatory techniques to adhere to spinal precautions    Toileting  Toileting Comments: educated pt on compensatory techniques for christina hygiene to maintain spinal precautions  Activity Tolerance:  Endurance: Tolerates 30 min exercise with multiple rests       Bed Mobility/Transfers: Bed Mobility  Bed Mobility: Yes  Bed Mobility 1  Bed Mobility 1: Supine to sitting, Log roll  Level of Assistance 1: Contact guard, Minimal verbal cues  Bed Mobility Comments 1: cues for sequencing steps of log  roll, HOB minimally elevated  Bed Mobility 2  Bed Mobility  2: Sitting to supine, Log roll  Level of Assistance 2: Minimum assistance, Minimal verbal cues  Bed Mobility Comments 2: assist at L LE, cues for log roll technique    Transfers  Transfer: Yes  Transfer 1  Technique 1: Sit to stand, Stand to sit  Transfer Device 1: Gait belt, Walker  Transfer Level of Assistance 1: Contact guard, Minimal verbal cues  Trials/Comments 1: from EOB and chair multiple trials, cues for safe hand placement, sequencing, B LE position and walker safety         Functional Mobility:  Functional Mobility  Functional Mobility Performed: Yes  Functional Mobility 1  Comments 1: Pt functionally navigated x max household distance using FWW with CGA x1. Cues for walker safety and upright posture, no LOB noted.  Sitting Balance:  Static Sitting Balance  Static Sitting-Balance Support: Bilateral upper extremity supported, Feet supported  Static Sitting-Level of Assistance: Close supervision  Static Sitting-Comment/Number of Minutes: at EOB  Dynamic Sitting Balance  Dynamic Sitting-Comments: SBA at EOB  Standing Balance:  Static Standing Balance  Static Standing-Balance Support: Bilateral upper extremity supported  Static Standing-Level of Assistance: Close supervision  Static Standing-Comment/Number of Minutes: FWW  Dynamic Standing Balance  Dynamic Standing-Comments: CGA using FWW       Therapy/Activity: Therapeutic Activity  Therapeutic Activity Performed: Yes  Therapeutic Activity 1: Pt educated on spinal precautions (already has packet from PT including all precautions), log roll for bed mobility, safe transfer techniques, walker safety, AE/DME for safe homegoing, compensatory techniques for ADLs/IADLs, etc.    Sensation:  Sensation Comment: pt reports numbness in distal B LEs at baseline  Strength:  Strength Comments: B UEs grossly 3/5 based on observation, not formally assessed d/t spinal precautions    Coordination:  Movements are Fluid  and Coordinated: Yes     Extremities: RUE   RUE : Within Functional Limits and LUE   LUE: Within Functional Limits      Outcome Measures: [facility] Daily Activity  Putting on and taking off regular lower body clothing: A little  Bathing (including washing, rinsing, drying): A little  Putting on and taking off regular upper body clothing: A little  Toileting, which includes using toilet, bedpan or urinal: A little  Taking care of personal grooming such as brushing teeth: A little  Eating Meals: None  Daily Activity - Total Score: 19        Education Documentation  Handouts, taught by Alecia Moss OT at 7/2/2025  3:57 PM.  Learner: Patient  Readiness: Acceptance  Method: Explanation, Demonstration, Handout  Response: Verbalizes Understanding, Needs Reinforcement  Comment: pt educated on spinal precautions, log roll for bed mobility, AE/DME for safe homegoing, compensatory techniques for ADLs/IADLs, walker safety, etc.    Body Mechanics, taught by Alecia Moss OT at 7/2/2025  3:57 PM.  Learner: Patient  Readiness: Acceptance  Method: Explanation, Demonstration, Handout  Response: Verbalizes Understanding, Needs Reinforcement  Comment: pt educated on spinal precautions, log roll for bed mobility, AE/DME for safe homegoing, compensatory techniques for ADLs/IADLs, walker safety, etc.    Precautions, taught by Alecia Moss OT at 7/2/2025  3:57 PM.  Learner: Patient  Readiness: Acceptance  Method: Explanation, Demonstration, Handout  Response: Verbalizes Understanding, Needs Reinforcement  Comment: pt educated on spinal precautions, log roll for bed mobility, AE/DME for safe homegoing, compensatory techniques for ADLs/IADLs, walker safety, etc.    ADL Training, taught by Alecia Moss OT at 7/2/2025  3:57 PM.  Learner: Patient  Readiness: Acceptance  Method: Explanation, Demonstration, Handout  Response: Verbalizes Understanding, Needs Reinforcement  Comment: pt educated on spinal precautions, log roll for bed  mobility, AE/DME for safe homegoing, compensatory techniques for ADLs/IADLs, walker safety, etc.           Goals:  Encounter Problems       Encounter Problems (Active)       ADLs       Patient will perform UB and LB bathing with modified independent level of assistance and grab bars, shower chair, and long-handled sponge.       Start:  07/02/25    Expected End:  07/16/25            Patient with complete upper body dressing with modified independent level of assistance donning and doffing all UE clothes with PRN adaptive equipment.       Start:  07/02/25    Expected End:  07/16/25            Patient with complete lower body dressing with modified independent level of assistance donning and doffing all LE clothes  with PRN adaptive equipment.       Start:  07/02/25    Expected End:  07/16/25            Patient will complete daily grooming tasks with modified independent level of assistance and PRN adaptive equipment while standing.       Start:  07/02/25    Expected End:  07/16/25            Patient will complete toileting including hygiene clothing management/hygiene with modified independent level of assistance and raised toilet seat and grab bars.       Start:  07/02/25    Expected End:  07/16/25               MOBILITY       Patient will perform Functional mobility x Household distances/Community Distances with modified independent level of assistance and least restrictive device in order to improve safety and functional mobility.       Start:  07/02/25    Expected End:  07/16/25               TRANSFERS       Patient will perform bed mobility modified independent level of assistance via log roll technique in order to improve safety and independence with mobility       Start:  07/02/25    Expected End:  07/16/25            Patient will complete functional transfers with least restrictive device with modified independent level of assistance.       Start:  07/02/25    Expected End:  07/16/25

## 2025-07-02 NOTE — PROGRESS NOTES
Pharmacy Medication History     Source of Information: Per patient     Additional concerns with the patient's PTA list.   N/a  Notified Provider via Haiku : No no major changes     The following updates were made to the Prior to Admission medication list:     Medications ADDED:   N/a  Medications CHANGED:  N/a  Medications REMOVED:   N/a  Medications NOT TAKING:   Peridex completed    Allergy reviewed : Yes    Meds 2 Beds : Yes    Outpatient pharmacy confirmed and updated in chart : Yes    Pharmacy name: [facility]     The list below reflectives the updated PTA list. Please review each medication in order reconciliation for additional clarification and justification.    Prior to Admission Medications   Prescriptions Last Dose   alfuzosin (Uroxatral) 10 mg 24 hr tablet 6/30/2025   Sig: Take 1 tablet (10 mg) by mouth once daily. Do not crush, chew, or split.   aspirin 81 mg EC tablet 6/30/2025 at  9:00 PM   Sig: Take 1 tablet (81 mg) by mouth once daily.             co-enzyme Q-10 30 mg capsule Past Week   Sig: Take 100 mg by mouth once daily.   gabapentin (Neurontin) 600 mg tablet 7/1/2025 Morning   Sig: Take 1 tablet (600 mg) by mouth 3 times a day.   metoprolol succinate XL (Toprol-XL) 25 mg 24 hr tablet 6/30/2025 Evening   Sig: Take 1 tablet (25 mg) by mouth once daily.   multivitamin tablet Past Week   Sig: Take 1 tablet by mouth once daily.   naproxen sodium (Aleve) 220 mg tablet Past Month   Sig: Take 1 tablet (220 mg) by mouth 2 times daily (morning and late afternoon).   omega-3 fatty acids-fish oil (Fish OiL) 360-1,200 mg capsule Past Week   Sig: Take 1 capsule (1,200 mg) by mouth once daily.   phenytoin ER (Dilantin) 100 mg capsule 6/30/2025 Evening   Sig: Take 1 capsule (100 mg) by mouth 3 times a day.   red yeast rice 600 mg capsule Past Week   Sig: Take 600 mg by mouth 2 times a day.      Facility-Administered Medications: None       The list below reflectives the updated allergy list.  Please review each documented allergy for additional clarification and justification.    No Known Allergies       07/02/25 at 4:05 PM - Siena Omer

## 2025-07-02 NOTE — CARE PLAN
The patient's goals for the shift include  safety    The clinical goals for the shift include pain control  Problem: Pain - Adult  Goal: Verbalizes/displays adequate comfort level or baseline comfort level  Outcome: Progressing     Problem: Safety - Adult  Goal: Free from fall injury  Outcome: Progressing     Problem: Discharge Planning  Goal: Discharge to home or other facility with appropriate resources  Outcome: Progressing     Problem: Chronic Conditions and Co-morbidities  Goal: Patient's chronic conditions and co-morbidity symptoms are monitored and maintained or improved  Outcome: Progressing     Problem: Nutrition  Goal: Nutrient intake appropriate for maintaining nutritional needs  Outcome: Progressing

## 2025-07-03 PROCEDURE — 97535 SELF CARE MNGMENT TRAINING: CPT | Mod: GO,CO

## 2025-07-03 PROCEDURE — 2500000001 HC RX 250 WO HCPCS SELF ADMINISTERED DRUGS (ALT 637 FOR MEDICARE OP): Performed by: ORTHOPAEDIC SURGERY

## 2025-07-03 PROCEDURE — 2500000004 HC RX 250 GENERAL PHARMACY W/ HCPCS (ALT 636 FOR OP/ED): Performed by: ORTHOPAEDIC SURGERY

## 2025-07-03 PROCEDURE — 97116 GAIT TRAINING THERAPY: CPT | Mod: GP,CQ | Performed by: PHYSICAL THERAPY ASSISTANT

## 2025-07-03 PROCEDURE — 97530 THERAPEUTIC ACTIVITIES: CPT | Mod: GO,CO

## 2025-07-03 PROCEDURE — 2500000001 HC RX 250 WO HCPCS SELF ADMINISTERED DRUGS (ALT 637 FOR MEDICARE OP)

## 2025-07-03 PROCEDURE — 1100000001 HC PRIVATE ROOM DAILY

## 2025-07-03 PROCEDURE — 97530 THERAPEUTIC ACTIVITIES: CPT | Mod: GP,CQ | Performed by: PHYSICAL THERAPY ASSISTANT

## 2025-07-03 RX ADMIN — OXYCODONE HYDROCHLORIDE 5 MG: 5 TABLET ORAL at 21:14

## 2025-07-03 RX ADMIN — PANTOPRAZOLE SODIUM 40 MG: 40 TABLET, DELAYED RELEASE ORAL at 06:46

## 2025-07-03 RX ADMIN — GABAPENTIN 600 MG: 300 CAPSULE ORAL at 15:55

## 2025-07-03 RX ADMIN — METOPROLOL SUCCINATE 25 MG: 25 TABLET, EXTENDED RELEASE ORAL at 08:29

## 2025-07-03 RX ADMIN — POLYETHYLENE GLYCOL 3350 17 G: 17 POWDER, FOR SOLUTION ORAL at 08:30

## 2025-07-03 RX ADMIN — PHENYTOIN SODIUM 100 MG: 100 CAPSULE, EXTENDED RELEASE ORAL at 08:29

## 2025-07-03 RX ADMIN — OXYCODONE HYDROCHLORIDE 10 MG: 5 TABLET ORAL at 08:28

## 2025-07-03 RX ADMIN — OXYCODONE HYDROCHLORIDE 10 MG: 5 TABLET ORAL at 17:08

## 2025-07-03 RX ADMIN — METHOCARBAMOL 750 MG: 500 TABLET ORAL at 03:46

## 2025-07-03 RX ADMIN — ASPIRIN 81 MG: 81 TABLET, DELAYED RELEASE ORAL at 08:29

## 2025-07-03 RX ADMIN — OXYCODONE HYDROCHLORIDE 10 MG: 5 TABLET ORAL at 03:46

## 2025-07-03 RX ADMIN — PHENYTOIN SODIUM 100 MG: 100 CAPSULE, EXTENDED RELEASE ORAL at 15:55

## 2025-07-03 RX ADMIN — GABAPENTIN 600 MG: 300 CAPSULE ORAL at 21:14

## 2025-07-03 RX ADMIN — OXYCODONE HYDROCHLORIDE 10 MG: 5 TABLET ORAL at 12:34

## 2025-07-03 RX ADMIN — METHOCARBAMOL 750 MG: 500 TABLET ORAL at 21:14

## 2025-07-03 RX ADMIN — PHENYTOIN SODIUM 100 MG: 100 CAPSULE, EXTENDED RELEASE ORAL at 21:14

## 2025-07-03 RX ADMIN — GABAPENTIN 600 MG: 300 CAPSULE ORAL at 08:28

## 2025-07-03 ASSESSMENT — COGNITIVE AND FUNCTIONAL STATUS - GENERAL
HELP NEEDED FOR BATHING: A LITTLE
CLIMB 3 TO 5 STEPS WITH RAILING: A LITTLE
WALKING IN HOSPITAL ROOM: A LITTLE
MOBILITY SCORE: 21
MOVING TO AND FROM BED TO CHAIR: A LITTLE
MOVING TO AND FROM BED TO CHAIR: A LITTLE
CLIMB 3 TO 5 STEPS WITH RAILING: A LITTLE
DAILY ACTIVITIY SCORE: 21
MOBILITY SCORE: 21
WALKING IN HOSPITAL ROOM: A LITTLE
MOBILITY SCORE: 20
MOVING TO AND FROM BED TO CHAIR: A LITTLE
TOILETING: A LITTLE
DRESSING REGULAR LOWER BODY CLOTHING: A LITTLE
DRESSING REGULAR UPPER BODY CLOTHING: A LITTLE
HELP NEEDED FOR BATHING: A LITTLE
HELP NEEDED FOR BATHING: A LITTLE
DRESSING REGULAR LOWER BODY CLOTHING: A LITTLE
DAILY ACTIVITIY SCORE: 19
DRESSING REGULAR LOWER BODY CLOTHING: A LITTLE
DAILY ACTIVITIY SCORE: 21
TOILETING: A LITTLE
CLIMB 3 TO 5 STEPS WITH RAILING: A LITTLE
WALKING IN HOSPITAL ROOM: A LITTLE
PERSONAL GROOMING: A LITTLE
STANDING UP FROM CHAIR USING ARMS: A LITTLE
TOILETING: A LITTLE

## 2025-07-03 ASSESSMENT — PAIN DESCRIPTION - LOCATION
LOCATION: BACK

## 2025-07-03 ASSESSMENT — PAIN - FUNCTIONAL ASSESSMENT
PAIN_FUNCTIONAL_ASSESSMENT: 0-10

## 2025-07-03 ASSESSMENT — PAIN SCALES - GENERAL
PAINLEVEL_OUTOF10: 5 - MODERATE PAIN
PAINLEVEL_OUTOF10: 3
PAINLEVEL_OUTOF10: 6
PAINLEVEL_OUTOF10: 7
PAINLEVEL_OUTOF10: 3
PAINLEVEL_OUTOF10: 7
PAINLEVEL_OUTOF10: 7
PAINLEVEL_OUTOF10: 4
PAINLEVEL_OUTOF10: 7

## 2025-07-03 ASSESSMENT — PAIN DESCRIPTION - DESCRIPTORS: DESCRIPTORS: ACHING;SORE;THROBBING

## 2025-07-03 ASSESSMENT — ACTIVITIES OF DAILY LIVING (ADL): HOME_MANAGEMENT_TIME_ENTRY: 22

## 2025-07-03 ASSESSMENT — PAIN DESCRIPTION - ORIENTATION
ORIENTATION: LOWER
ORIENTATION: LOWER

## 2025-07-03 NOTE — CARE PLAN
The patient's goals for the shift include      The clinical goals for the shift include Patient to call for assistance with ambulatio/ADL's      Problem: Pain - Adult  Goal: Verbalizes/displays adequate comfort level or baseline comfort level  Outcome: Progressing     Problem: Safety - Adult  Goal: Free from fall injury  Outcome: Progressing     Problem: Discharge Planning  Goal: Discharge to home or other facility with appropriate resources  Outcome: Progressing     Problem: Chronic Conditions and Co-morbidities  Goal: Patient's chronic conditions and co-morbidity symptoms are monitored and maintained or improved  Outcome: Progressing     Problem: Nutrition  Goal: Nutrient intake appropriate for maintaining nutritional needs  Outcome: Progressing

## 2025-07-03 NOTE — NURSING NOTE
Interdisciplinary team present: NP, PT, NM, CC, SW, Orthopedic Coordinator, and bedside RN.  Pain - controlled  Nausea - none  Discharge barrier - Hemovac drain  Discharge plan - home  Discharge date/time - today

## 2025-07-03 NOTE — PROGRESS NOTES
"Ashu Gomez is a 75 y.o. male on day 2 of admission presenting with Spinal stenosis, lumbar region with neurogenic claudication.    Subjective   Postoperative day 1    He is doing well.  Preoperative radicular symptoms improved.    Afebrile and vital signs stable.    Moderate drain output.    Strength intact.    Stable course.    Mobilize with therapy and maintain drain.       Objective     Physical Exam    Last Recorded Vitals  Blood pressure 129/85, pulse 60, temperature 36.2 °C (97.2 °F), temperature source Temporal, resp. rate 18, height 1.702 m (5' 7\"), weight 63.7 kg (140 lb 6.9 oz), SpO2 95%.  Intake/Output last 3 Shifts:  I/O last 3 completed shifts:  In: 240 (3.8 mL/kg) [P.O.:240]  Out: 990 (15.5 mL/kg) [Urine:850 (0.4 mL/kg/hr); Drains:140]  Weight: 63.7 kg     Relevant Results      Scheduled medications  Scheduled Medications[1]  Continuous medications  Continuous Medications[2]  PRN medications  PRN Medications[3]  No results found for this or any previous visit (from the past 24 hours).                         Assessment & Plan  Spinal stenosis, lumbar region with neurogenic claudication    Degenerative lumbar spinal stenosis         I spent   minutes in the professional and overall care of this patient.      Sammy Moody MD           [1] aspirin, 81 mg, oral, Daily  gabapentin, 600 mg, oral, TID  metoprolol succinate XL, 25 mg, oral, Daily  pantoprazole, 40 mg, oral, Daily before breakfast  phenytoin ER, 100 mg, oral, TID  polyethylene glycol, 17 g, oral, Daily    [2]    [3] PRN medications: diphenhydrAMINE **OR** diphenhydrAMINE, HYDROmorphone, methocarbamol, naloxone, oxyCODONE, oxyCODONE, oxyCODONE, oxygen, prochlorperazine **OR** prochlorperazine **OR** [DISCONTINUED] prochlorperazine    "

## 2025-07-03 NOTE — PROGRESS NOTES
Occupational Therapy    OT Treatment    Patient Name: Ashu Gomez  MRN: [id number]  Department: [facility] A 7  Room: [facility]  Today's Date: 7/3/2025  Time Calculation  Start Time: 0841  Stop Time: 0920  Time Calculation (min): 39 min        Assessment:  OT Assessment: Tolerated session well, demonstrating progression towards POC with extensive education provided this date on importance of spinal precautions + energy conservation techniques upon home going. Pt would benefit from continued skilled OT services to improve strength, balance, and functional tolerance to increase independence with ADL tasks  Prognosis: Good  Barriers to Discharge Home: No anticipated barriers  Evaluation/Treatment Tolerance: Patient tolerated treatment well, Patient limited by pain  End of Session Communication: Bedside nurse  End of Session Patient Position: Bed, 3 rail up, Alarm off, caregiver present (daughter present-educated on use of call light)  OT Assessment Results: Decreased ADL status, Decreased upper extremity strength, Decreased safe judgment during ADL, Decreased endurance, Decreased functional mobility, Decreased IADLs, Decreased trunk control for functional activities, Decreased sensation  Prognosis: Good  Evaluation/Treatment Tolerance: Patient tolerated treatment well, Patient limited by pain  Plan:  Treatment Interventions: ADL retraining, Functional transfer training, UE strengthening/ROM, Endurance training, Patient/family training, Equipment evaluation/education, Compensatory technique education  OT Frequency: 3 times per week  OT Discharge Recommendations: Low intensity level of continued care  Equipment Recommended upon Discharge: Wheeled walker  OT Recommended Transfer Status: Assist of 1  OT - OK to Discharge: Yes (OT POC established this date)  Treatment Interventions: ADL retraining, Functional transfer training, UE strengthening/ROM, Endurance training, Patient/family training, Equipment evaluation/education,  Compensatory technique education    Subjective   OT Visit Info:  OT Received On: 07/03/25  General Visit Info:  General  Reason for Referral: Pt is POD#2 L2-L5 lumbar spine decompression.  Referred By: BINU YAO)  Past Medical History Relevant to Rehab: Arthritis, IBS, chronic pain disorder, peripheral neuropathy, seizure disorder, spinal stenosis.  Family/Caregiver Present: Yes  Caregiver Feedback: daughter present and supportive  Prior to Session Communication: Bedside nurse  Patient Position Received: Up in bathroom  General Comment: Cleared for therapy per RN. Pt seated on toilet upon arrival with increased time required for pericare; agreeable to tx  Precautions:  Hearing/Visual Limitations: +Reading glasses  Medical Precautions: Fall precautions, Spinal precautions  Post-Surgical Precautions: Spinal precautions  Precautions Comment: hemovac drain, educated on precautions with pt demonstrating fair carryover    Pain:  Pain Assessment  Pain Assessment: 0-10  0-10 (Numeric) Pain Score: 6  Pain Type: Surgical pain  Pain Location: Back  Pain Orientation: Lower  Pain Interventions: Repositioned, Ambulation/increased activity (RN aware and medicated prior to session)    Objective    Cognition:  Cognition  Orientation Level: Oriented X4    Activities of Daily Living:    Grooming  Grooming Level of Assistance: Setup, Close supervision  Grooming Where Assessed: Standing sinkside  Grooming Comments: face + hand washing and oral hygiene tasks standing sinkside with intermittent cues for postural alignment    LE Dressing  LE Dressing: Yes  Pants Level of Assistance: Contact guard  LE Dressing Where Assessed: Toilet, Chair  LE Dressing Comments: pt able to don pants in standing following toileting task with increased effort with CGA for balance/safety. Pt educated on modified figure four dressing technique for carryover of precautions with pt able to demonstrate with ease  + expressing  understanding    Toileting  Toileting Level of Assistance: Minimum assistance, Close supervision  Where Assessed: Toilet  Toileting Comments: increased time required for toileting task this date with pt able to complete majority of pericare hygiene in sitting, requiring min A for task completion in standing.    Bed Mobility/Transfers: Bed Mobility  Bed Mobility: Yes  Bed Mobility 1  Bed Mobility 1: Sitting to supine, Log roll  Level of Assistance 1: Minimum assistance, Minimal verbal cues  Bed Mobility Comments 1: assist to guide BLE on bed with VC for use of logroll technique + bring BLE to midline in bed with increased time + effort required.    Transfers  Transfer: Yes  Transfer 1  Technique 1: Sit to stand, Stand to sit  Transfer Device 1: Walker  Transfer Level of Assistance 1: Minimum assistance, Minimal verbal cues  Trials/Comments 1: assist for trunk elevation with cues for proper hand placement and eccentric lowering to surface    Toilet Transfers  Toilet Transfer Type: To and from  Toilet Transfer to: Standard toilet  Toilet Transfers: Minimal assistance  Toilet Transfers Comments: assist for trunk elevation with cues for use of grab bar for UE support    Functional Mobility:  Functional Mobility  Functional Mobility Performed: Yes  Functional Mobility 1  Device 1: Rolling walker  Assistance 1: Contact guard  Comments 1: Participated in functional mobility extended household distance at [facility] with VC for pacing for increased safety awareness, demonstrating fair- balance throughout task. Rest break x1 required in standing for additional education on postural alignment with pt able to adjust posture to upright with cues and continuing task.    Standing Balance:  Dynamic Standing Balance  Dynamic Standing-Level of Assistance: Contact guard, Close supervision  Dynamic Standing-Balance: Forward lean, Lateral lean, Reaching for objects, Reaching across midline  Dynamic Standing-Comments: fair- balance during  standing ADL tasks + functional mobility    Therapy/Activity:    Therapeutic Activity  Therapeutic Activity Performed: Yes  Therapeutic Activity 1: pt tolerated standing sinkside ~10 min during various ADL tasks with intermittent VC for postural alignment    Other Activity:  Other Activity Performed: Yes  Other Activity 1: extensive education provided this date to pt + daughter on homegoing modifications + carryover of sternal precautions during all ADL/IADL tasks with both expressing understanding. Additional questions/concerns addressed to satisfaction.    Outcome Measures:[facility] Daily Activity  Putting on and taking off regular lower body clothing: A little  Bathing (including washing, rinsing, drying): A little  Putting on and taking off regular upper body clothing: A little  Toileting, which includes using toilet, bedpan or urinal: A little  Taking care of personal grooming such as brushing teeth: A little  Eating Meals: None  Daily Activity - Total Score: 19    Education Documentation  Handouts, taught by SUSAN Avila at 7/3/2025 10:04 AM.  Learner: Family, Patient  Readiness: Acceptance  Method: Explanation  Response: Verbalizes Understanding    Body Mechanics, taught by SUSAN Avila at 7/3/2025 10:04 AM.  Learner: Family, Patient  Readiness: Acceptance  Method: Explanation  Response: Verbalizes Understanding    Precautions, taught by SUSAN Avila at 7/3/2025 10:04 AM.  Learner: Family, Patient  Readiness: Acceptance  Method: Explanation  Response: Verbalizes Understanding    ADL Training, taught by SUSAN Avila at 7/3/2025 10:04 AM.  Learner: Family, Patient  Readiness: Acceptance  Method: Explanation  Response: Verbalizes Understanding    Education Comments  No comments found.    Problem: ADLs  Goal: Patient will perform UB and LB bathing with modified independent level of assistance and grab bars, shower chair, and long-handled sponge.  Outcome:  Progressing  Goal: Patient with complete upper body dressing with modified independent level of assistance donning and doffing all UE clothes with PRN adaptive equipment.  Outcome: Progressing  Goal: Patient with complete lower body dressing with modified independent level of assistance donning and doffing all LE clothes  with PRN adaptive equipment.  Outcome: Progressing  Goal: Patient will complete daily grooming tasks with modified independent level of assistance and PRN adaptive equipment while standing.  Outcome: Progressing  Goal: Patient will complete toileting including hygiene clothing management/hygiene with modified independent level of assistance and raised toilet seat and grab bars.  Outcome: Progressing     Problem: TRANSFERS  Goal: Patient will perform bed mobility modified independent level of assistance via log roll technique in order to improve safety and independence with mobility  Outcome: Progressing  Goal: Patient will complete functional transfers with least restrictive device with modified independent level of assistance.  Outcome: Progressing     Problem: MOBILITY  Goal: Patient will perform Functional mobility x Household distances/Community Distances with modified independent level of assistance and least restrictive device in order to improve safety and functional mobility.  Outcome: Progressing

## 2025-07-03 NOTE — PROGRESS NOTES
Physical Therapy    Physical Therapy Treatment    Patient Name: Ashu Gomez  MRN: [id number]  Department: [place] [address]  Room: [facility]  Today's Date: 7/3/2025  Time Calculation  Start Time: 0931  Stop Time: 0957  Time Calculation (min): 26 min         Assessment/Plan   PT Assessment  Rehab Prognosis: Good  Barriers to Discharge Home: No anticipated barriers  End of Session Communication: Bedside nurse  Assessment Comment:  (pt demo good job to tx)  End of Session Patient Position: Up in chair, Alarm on  PT Plan  Inpatient/Swing Bed or Outpatient: Inpatient  PT Plan  Treatment/Interventions: Bed mobility, Transfer training, Gait training, Stair training, Therapeutic activity  PT Plan: Ongoing PT  PT Frequency: Daily  PT Discharge Recommendations: Low intensity level of continued care  Equipment Recommended upon Discharge: Wheeled walker  PT Recommended Transfer Status: Contact guard, Assistive device  PT - OK to Discharge: Yes (per PT POC)    PT Visit Info:  PT Received On: 07/03/25     General Visit Information:   General  Reason for Referral: Pt is POD#2 L2-L5 lumbar spine decompression.  Referred By: BINU YAO)  Family/Caregiver Present: Yes  Caregiver Feedback: daughter present and supportive  Prior to Session Communication: Bedside nurse  Patient Position Received: Up in chair, Alarm on  Preferred Learning Style: auditory, kinesthetic, verbal, visual  General Comment:  (pt agreeable to tx.)    Subjective   Precautions:  Precautions  Medical Precautions: Fall precautions, Spinal precautions  Post-Surgical Precautions: Spinal precautions (able to recollect)  Precautions Comment: hemovac drain            Objective   Pain:  Pain Assessment  Pain Assessment: 0-10  0-10 (Numeric) Pain Score: 3  Pain Type: Surgical pain  Pain Location: Back  Pain Orientation: Lower  Cognition:  Cognition  Orientation Level: Oriented X4    Treatments:            Ambulation/Gait Training  Ambulation/Gait Training Performed:  Yes  Ambulation/Gait Training 1  Surface 1: Level tile  Device 1: Rolling walker  Gait Support Devices: Gait belt  Assistance 1: Close supervision  Quality of Gait 1: Narrow base of support, Decreased step length, Forward flexed posture  Comments/Distance (ft) 1: approx 80 ft x2 (pt demo improved endurance and balance,cues for upright posture.)  Transfers  Transfer: Yes  Transfer 1  Transfer From 1: Sit to, Stand to  Technique 1: Sit to stand, Stand to sit  Transfer Device 1: Walker, Gait belt  Transfer Level of Assistance 1: Close supervision  Trials/Comments 1:  (pt demo good sequencing with some cues)    Stairs  Stairs: Yes (pt performed 1 curb step x2 with FWW and cues for proper sequencing, SBA)    Outcome Measures:  [facility] Basic Mobility  Turning from your back to your side while in a flat bed without using bedrails: None  Moving from lying on your back to sitting on the side of a flat bed without using bedrails: None  Moving to and from bed to chair (including a wheelchair): A little  Standing up from a chair using your arms (e.g. wheelchair or bedside chair): A little  To walk in hospital room: A little  Climbing 3-5 steps with railing: A little  Basic Mobility - Total Score: 20    Education Documentation  Handouts, taught by Ivan Tavares PTA at 7/3/2025  1:05 PM.  Learner: Patient  Readiness: Acceptance  Method: Explanation  Response: Needs Reinforcement    Precautions, taught by Ivan Tavares PTA at 7/3/2025  1:05 PM.  Learner: Patient  Readiness: Acceptance  Method: Explanation  Response: Needs Reinforcement    Body Mechanics, taught by Ivan Tavares PTA at 7/3/2025  1:05 PM.  Learner: Patient  Readiness: Acceptance  Method: Explanation  Response: Needs Reinforcement    Mobility Training, taught by Ivan Tavares PTA at 7/3/2025  1:05 PM.  Learner: Patient  Readiness: Acceptance  Method: Explanation  Response: Needs Reinforcement    Education Comments  No comments  found.        OP EDUCATION:       Encounter Problems       Encounter Problems (Active)       Balance       Goal 1 (Progressing)       Start:  07/01/25    Expected End:  07/15/25       Pt performs all sitting balance IND and standing balance with supervision using LRAD            Mobility       STG - Patient will ambulate (Progressing)       Start:  07/01/25    Expected End:  07/15/25       >150 ft IND using LRAD with proper gait mechanics         STG - Patient will ascend and descend a flight of stairs (Progressing)       Start:  07/01/25    Expected End:  07/15/25       With LRAD and CGA            PT Transfers       STG - Patient will perform bed mobility (Progressing)       Start:  07/01/25    Expected End:  07/15/25       Mod IND from a flat bed         STG - Patient will transfer sit to and from stand (Progressing)       Start:  07/01/25    Expected End:  07/15/25       Mod IND using LRAD with proper body mechanics

## 2025-07-03 NOTE — CARE PLAN
The patient's goals for the shift include  dc    The clinical goals for the shift include Patient to call for assistance with ambulatio/ADL's    Over the shift, the patient did not make progress toward the following goals. Barriers to progression include . Recommendations to address these barriers include .

## 2025-07-03 NOTE — PROGRESS NOTES
"Ashu Gomez is a 75 y.o. male on day 2 of admission presenting with Spinal stenosis, lumbar region with neurogenic claudication.    Subjective   Postoperative day 2    He continues to do well.  Radicular symptoms improved.  Ambulating short distances.  Voiding.    Afebrile and vital signs stable.  Moderate drain output.    Anticipate discharge tomorrow after drain removal.    Discussed at length with patient, his daughter, and clinical nursing team.       Objective     Physical Exam    Last Recorded Vitals  Blood pressure 129/85, pulse 60, temperature 36.2 °C (97.2 °F), temperature source Temporal, resp. rate 18, height 1.702 m (5' 7\"), weight 63.7 kg (140 lb 6.9 oz), SpO2 95%.  Intake/Output last 3 Shifts:  I/O last 3 completed shifts:  In: 240 (3.8 mL/kg) [P.O.:240]  Out: 990 (15.5 mL/kg) [Urine:850 (0.4 mL/kg/hr); Drains:140]  Weight: 63.7 kg     Relevant Results      Scheduled medications  Scheduled Medications[1]  Continuous medications  Continuous Medications[2]  PRN medications  PRN Medications[3]  No results found for this or any previous visit (from the past 24 hours).                         Assessment & Plan  Spinal stenosis, lumbar region with neurogenic claudication    Degenerative lumbar spinal stenosis         I spent   minutes in the professional and overall care of this patient.      Sammy Moody MD           [1] aspirin, 81 mg, oral, Daily  gabapentin, 600 mg, oral, TID  metoprolol succinate XL, 25 mg, oral, Daily  pantoprazole, 40 mg, oral, Daily before breakfast  phenytoin ER, 100 mg, oral, TID  polyethylene glycol, 17 g, oral, Daily    [2]    [3] PRN medications: diphenhydrAMINE **OR** diphenhydrAMINE, HYDROmorphone, methocarbamol, naloxone, oxyCODONE, oxyCODONE, oxyCODONE, oxygen, prochlorperazine **OR** prochlorperazine **OR** [DISCONTINUED] prochlorperazine    "

## 2025-07-04 VITALS
HEART RATE: 79 BPM | DIASTOLIC BLOOD PRESSURE: 69 MMHG | TEMPERATURE: 98.2 F | BODY MASS INDEX: 22.04 KG/M2 | OXYGEN SATURATION: 93 % | SYSTOLIC BLOOD PRESSURE: 99 MMHG | WEIGHT: 140.43 LBS | HEIGHT: 67 IN | RESPIRATION RATE: 15 BRPM

## 2025-07-04 PROCEDURE — 2500000004 HC RX 250 GENERAL PHARMACY W/ HCPCS (ALT 636 FOR OP/ED): Performed by: ORTHOPAEDIC SURGERY

## 2025-07-04 PROCEDURE — 2500000001 HC RX 250 WO HCPCS SELF ADMINISTERED DRUGS (ALT 637 FOR MEDICARE OP)

## 2025-07-04 PROCEDURE — 2500000001 HC RX 250 WO HCPCS SELF ADMINISTERED DRUGS (ALT 637 FOR MEDICARE OP): Performed by: ORTHOPAEDIC SURGERY

## 2025-07-04 RX ORDER — DOCUSATE SODIUM 100 MG/1
100 CAPSULE, LIQUID FILLED ORAL 2 TIMES DAILY
Qty: 10 CAPSULE | Refills: 0 | Status: SHIPPED | OUTPATIENT
Start: 2025-07-04

## 2025-07-04 RX ORDER — OXYCODONE HYDROCHLORIDE 5 MG/1
5 TABLET ORAL EVERY 6 HOURS PRN
Qty: 28 TABLET | Refills: 0 | Status: SHIPPED | OUTPATIENT
Start: 2025-07-04 | End: 2025-07-11

## 2025-07-04 RX ADMIN — OXYCODONE HYDROCHLORIDE 10 MG: 5 TABLET ORAL at 11:07

## 2025-07-04 RX ADMIN — GABAPENTIN 600 MG: 300 CAPSULE ORAL at 09:59

## 2025-07-04 RX ADMIN — PANTOPRAZOLE SODIUM 40 MG: 40 TABLET, DELAYED RELEASE ORAL at 06:38

## 2025-07-04 RX ADMIN — POLYETHYLENE GLYCOL 3350 17 G: 17 POWDER, FOR SOLUTION ORAL at 09:59

## 2025-07-04 RX ADMIN — PHENYTOIN SODIUM 100 MG: 100 CAPSULE, EXTENDED RELEASE ORAL at 09:59

## 2025-07-04 RX ADMIN — ASPIRIN 81 MG: 81 TABLET, DELAYED RELEASE ORAL at 09:59

## 2025-07-04 ASSESSMENT — COGNITIVE AND FUNCTIONAL STATUS - GENERAL
DAILY ACTIVITIY SCORE: 22
MOBILITY SCORE: 21
WALKING IN HOSPITAL ROOM: A LITTLE
HELP NEEDED FOR BATHING: A LITTLE
TOILETING: A LITTLE
MOVING TO AND FROM BED TO CHAIR: A LITTLE
CLIMB 3 TO 5 STEPS WITH RAILING: A LITTLE

## 2025-07-04 ASSESSMENT — PAIN DESCRIPTION - ORIENTATION: ORIENTATION: LEFT;MID

## 2025-07-04 ASSESSMENT — PAIN DESCRIPTION - DESCRIPTORS: DESCRIPTORS: ACHING;DISCOMFORT

## 2025-07-04 ASSESSMENT — PAIN DESCRIPTION - LOCATION: LOCATION: BACK

## 2025-07-04 ASSESSMENT — PAIN SCALES - GENERAL
PAINLEVEL_OUTOF10: 6
PAINLEVEL_OUTOF10: 7

## 2025-07-04 ASSESSMENT — PAIN - FUNCTIONAL ASSESSMENT
PAIN_FUNCTIONAL_ASSESSMENT: 0-10
PAIN_FUNCTIONAL_ASSESSMENT: 0-10

## 2025-07-04 NOTE — DISCHARGE SUMMARY
Discharge Diagnosis  Spinal stenosis, lumbar region with neurogenic claudication    Issues Requiring Follow-Up      Test Results Pending At Discharge  Pending Labs       No current pending labs.            Hospital Course   This 75-year-old man has developed disabling neurogenic claudication and lumbar radiculopathy.  He has severe multilevel stenosis with preserved lordosis.  On the day of admission he underwent a multilevel decompression.  Postoperatively he did well.  He had excellent relief of his preoperative radicular symptoms.  He was neurologically intact.  He was mobilized with therapy.  He voided.  He moved his bowels.    On postoperative day 3 his drain was removed.  His incision was clean and dry.    He was cleared by physical therapy for discharge home.  We discussed at length with the patient and his family members about discharge planning and they agree with the plan for discharge home with appropriate follow-up.    ** Dictated with voice recognition software and not immediately reviewed for errors in grammar and/or spelling **    Visit Vitals  BP (!) 149/92 (Patient Position: Lying)   Pulse 69   Temp 37.2 °C (98.9 °F) (Temporal)   Resp 16     Vitals:    07/01/25 0720   Weight: 63.7 kg (140 lb 6.9 oz)       Immunization History   Administered Date(s) Administered    COVID-19, mRNA, LNP-S, PF, 30 mcg/0.3 mL dose 12/16/2021    Moderna SARS-CoV-2 Vaccination 03/09/2021, 04/06/2021    Pfizer COVID-19 vaccine, 12 years and older, (30mcg/0.3mL) (Comirnaty) 12/18/2023    Pfizer COVID-19 vaccine, bivalent, age 12 years and older (30 mcg/0.3 mL) 11/04/2022       Results        Pertinent Physical Exam At Time of Discharge  Physical Exam    Home Medications     Medication List      START taking these medications     docusate sodium 100 mg capsule; Commonly known as: Colace; Take 1   capsule (100 mg) by mouth 2 times a day.   oxyCODONE 5 mg immediate release tablet; Commonly known as: Roxicodone;   Take 1 tablet  (5 mg) by mouth every 6 hours if needed for severe pain (7 -   10) for up to 7 days.     CONTINUE taking these medications     alfuzosin 10 mg 24 hr tablet; Commonly known as: Uroxatral   aspirin 81 mg EC tablet   gabapentin 600 mg tablet; Commonly known as: Neurontin   metoprolol succinate XL 25 mg 24 hr tablet; Commonly known as: Toprol-XL   phenytoin  mg capsule; Commonly known as: Dilantin     STOP taking these medications     Aleve 220 mg tablet; Generic drug: naproxen sodium   chlorhexidine 0.12 % solution; Commonly known as: Peridex   coenzyme Q10 100 mg tablet   Fish OiL 360-1,200 mg capsule; Generic drug: omega-3 fatty acids-fish   oil   multivitamin tablet   red yeast rice 600 mg capsule       Outpatient Follow-Up  Future Appointments   Date Time Provider Department Center   7/30/2025 10:40 AM Sammy Moody MD [id number]       Sammy Moody MD

## 2025-07-04 NOTE — DISCHARGE INSTRUCTIONS
No driving for 4 weeks  Limit activities take short walks.  Avoid excessive bending and lifting and twisting.  No lifting greater than 5 pounds    May shower on Monday.  May remove dressing prior to showering and then replace with a dry dressing.    Call Dr. Moody with any concerns. Cell phone [phone]  Dr. Moody's office to call to confirm follow-up appointment

## 2025-07-04 NOTE — CARE PLAN
The patient's goals for the shift include      The clinical goals for the shift include Patient pain will be managed throughout shift      Problem: Pain - Adult  Goal: Verbalizes/displays adequate comfort level or baseline comfort level  7/4/2025 1223 by Mirna Tejada RN  Outcome: Met  7/4/2025 1223 by Mirna Tejada RN  Outcome: Progressing     Problem: Safety - Adult  Goal: Free from fall injury  7/4/2025 1223 by Mirna Tejada RN  Outcome: Met  7/4/2025 1223 by Mirna Tejada RN  Outcome: Progressing     Problem: Discharge Planning  Goal: Discharge to home or other facility with appropriate resources  7/4/2025 1223 by Mirna Tejada RN  Outcome: Met  7/4/2025 1223 by Mirna Tejada RN  Outcome: Progressing     Problem: Chronic Conditions and Co-morbidities  Goal: Patient's chronic conditions and co-morbidity symptoms are monitored and maintained or improved  7/4/2025 1223 by Mirna Tejada RN  Outcome: Met  7/4/2025 1223 by Mirna Tejada RN  Outcome: Progressing     Problem: Nutrition  Goal: Nutrient intake appropriate for maintaining nutritional needs  7/4/2025 1223 by Mirna Tejada RN  Outcome: Met  7/4/2025 1223 by Minra Tejada RN  Outcome: Progressing

## 2025-07-04 NOTE — CARE PLAN
The patient's goals for the shift include      The clinical goals for the shift include Patient to use call light for assistance      Problem: Pain - Adult  Goal: Verbalizes/displays adequate comfort level or baseline comfort level  Outcome: Progressing     Problem: Safety - Adult  Goal: Free from fall injury  Outcome: Progressing     Problem: Discharge Planning  Goal: Discharge to home or other facility with appropriate resources  Outcome: Progressing     Problem: Chronic Conditions and Co-morbidities  Goal: Patient's chronic conditions and co-morbidity symptoms are monitored and maintained or improved  Outcome: Progressing     Problem: Nutrition  Goal: Nutrient intake appropriate for maintaining nutritional needs  Outcome: Progressing

## 2025-07-08 ENCOUNTER — TELEPHONE (OUTPATIENT)
Dept: INPATIENT UNIT | Facility: HOSPITAL | Age: 75
End: 2025-07-08
Payer: MEDICARE

## 2025-07-10 ENCOUNTER — TELEPHONE (OUTPATIENT)
Dept: INPATIENT UNIT | Facility: HOSPITAL | Age: 75
End: 2025-07-10
Payer: MEDICARE

## 2025-07-10 NOTE — SIGNIFICANT EVENT
Spoke with patient during follow-up phone call.  Patient indicates that they are doing well and pain is under control.  Patient denies questions related to discharge instructions or homegoing medications.  Patient is aware of follow up visit with surgeon's office. Patient denies addtional questions or concerns at this time and verbalizes understanding to call RN Navigator or Surgeon's office with any new or worsening symptoms.

## 2025-07-30 ENCOUNTER — HOSPITAL ENCOUNTER (OUTPATIENT)
Dept: RADIOLOGY | Facility: CLINIC | Age: 75
Discharge: HOME | End: 2025-07-30
Payer: MEDICARE

## 2025-07-30 ENCOUNTER — APPOINTMENT (OUTPATIENT)
Dept: ORTHOPEDIC SURGERY | Facility: CLINIC | Age: 75
End: 2025-07-30
Payer: MEDICARE

## 2025-07-30 DIAGNOSIS — M54.16 LUMBAR RADICULOPATHY: ICD-10-CM

## 2025-07-30 PROCEDURE — 99024 POSTOP FOLLOW-UP VISIT: CPT | Performed by: ORTHOPAEDIC SURGERY

## 2025-07-30 PROCEDURE — 1125F AMNT PAIN NOTED PAIN PRSNT: CPT | Performed by: ORTHOPAEDIC SURGERY

## 2025-07-30 PROCEDURE — 72100 X-RAY EXAM L-S SPINE 2/3 VWS: CPT | Performed by: RADIOLOGY

## 2025-07-30 PROCEDURE — 1111F DSCHRG MED/CURRENT MED MERGE: CPT | Performed by: ORTHOPAEDIC SURGERY

## 2025-07-30 PROCEDURE — 72100 X-RAY EXAM L-S SPINE 2/3 VWS: CPT

## 2025-07-30 PROCEDURE — 1159F MED LIST DOCD IN RCRD: CPT | Performed by: ORTHOPAEDIC SURGERY

## 2025-07-30 ASSESSMENT — PAIN SCALES - GENERAL: PAINLEVEL_OUTOF10: 2

## 2025-07-30 ASSESSMENT — PAIN - FUNCTIONAL ASSESSMENT: PAIN_FUNCTIONAL_ASSESSMENT: 0-10

## 2025-07-30 NOTE — PROGRESS NOTES
Ray is 4 weeks from surgery and he has done well.  He notes significant improvement in his severe radicular symptoms.    Incision healed.  Strength intact.    X-rays show laminectomy defects without change in alignment.    Stable course.  He will continue to walk with a walker.  His family members have noticed that he still dragging his right foot.    I would suggest he work on a stationary bike at home over the next several weeks and then begin a formal therapy program.  He will hold off on driving till he has had at least 4 weeks of physical therapy.  We will see him back in 6 weeks for clinical check.

## 2025-09-10 ENCOUNTER — APPOINTMENT (OUTPATIENT)
Dept: ORTHOPEDIC SURGERY | Facility: CLINIC | Age: 75
End: 2025-09-10
Payer: MEDICARE

## (undated) DEVICE — DRAIN, WOUND, ROUND, W/TROCAR, HOLE PATTERN, 10 IN, MEDIUM/LARGE, 3/16 X 49 IN

## (undated) DEVICE — PREP TRAY, VAGINAL

## (undated) DEVICE — SUTURE, VICRYL PLUS, 4-0, 27 IN, PS-2

## (undated) DEVICE — LEGEND, BALL FLUTED, 9 CM, 5MM

## (undated) DEVICE — Device

## (undated) DEVICE — GLOVE, SURGICAL, PROTEXIS PI BLUE W/NEUTHERA, 8.5, PF, LF

## (undated) DEVICE — SUTURE, VICRYL, 1, 27 IN, CT-1, VIOLET

## (undated) DEVICE — SPONGE, HEMOSTATIC, GELATIN, SURGIFOAM, 2 X 6 CM X 7 MM

## (undated) DEVICE — EVACUATOR, WOUND, CLOSED, 3 SPRING, 400 CC, Y CONNECTING TUBE

## (undated) DEVICE — TIP, SUCTION, FRAZIER, W/CONTROL VENT, 12 FR

## (undated) DEVICE — SPONGE, NEURO, 1 X 1 IN, STERILE

## (undated) DEVICE — DRAPE, INCISE, ANTIMICROBIAL, IOBAN 2, STERI DRAPE, 23 X 33 IN, DISPOSABLE, STERILE

## (undated) DEVICE — LEGEND, BALL FLUTED, 9 CM, 3 MM

## (undated) DEVICE — GLOVE, SURGICAL, PROTEXIS PI MICRO, 8.0, PF, LF

## (undated) DEVICE — SUTURE, VICRYL PLUS, 2-0, 27IN, PSL, UND, BRAIDED

## (undated) DEVICE — DRAPE, SHEET, THREE QUARTER, FAN FOLD, 57 X 77 IN

## (undated) DEVICE — ELECTRODE, ELECTROSURGICAL, BLADE, NONSTICK, MODIFIED, 6.5 IN X 165 MM

## (undated) DEVICE — SUTURE, ETHILON, 2-0, 18 IN, FS, BLACK, BX/12

## (undated) DEVICE — CATHETER, IV, ANGIOCATH, SPECIAL PLACEMENT, 14 G X 3.25 IN, FEP POLYMER

## (undated) DEVICE — ELECTRODE, ELECTROSURGICAL, BLADE, STANDARD, 2.75 IN

## (undated) DEVICE — KIT, PATIENT CARE, JACKSON TABLE W/PRONE-SAFE HEADREST

## (undated) DEVICE — TAPE, SURGICAL, FOAM, MICROFOAM, HYPOALLERGENIC, 3 IN X 5.5 YD